# Patient Record
Sex: FEMALE | Race: WHITE | URBAN - METROPOLITAN AREA
[De-identification: names, ages, dates, MRNs, and addresses within clinical notes are randomized per-mention and may not be internally consistent; named-entity substitution may affect disease eponyms.]

---

## 2021-04-15 ENCOUNTER — APPOINTMENT (RX ONLY)
Dept: URBAN - METROPOLITAN AREA CLINIC 49 | Facility: CLINIC | Age: 71
Setting detail: DERMATOLOGY
End: 2021-04-15

## 2021-04-15 DIAGNOSIS — D22 MELANOCYTIC NEVI: ICD-10-CM

## 2021-04-15 DIAGNOSIS — L57.0 ACTINIC KERATOSIS: ICD-10-CM

## 2021-04-15 DIAGNOSIS — Z71.89 OTHER SPECIFIED COUNSELING: ICD-10-CM

## 2021-04-15 DIAGNOSIS — L82.1 OTHER SEBORRHEIC KERATOSIS: ICD-10-CM

## 2021-04-15 DIAGNOSIS — L81.4 OTHER MELANIN HYPERPIGMENTATION: ICD-10-CM

## 2021-04-15 DIAGNOSIS — D18.0 HEMANGIOMA: ICD-10-CM

## 2021-04-15 DIAGNOSIS — D485 NEOPLASM OF UNCERTAIN BEHAVIOR OF SKIN: ICD-10-CM | Status: WORSENING

## 2021-04-15 PROBLEM — D18.01 HEMANGIOMA OF SKIN AND SUBCUTANEOUS TISSUE: Status: ACTIVE | Noted: 2021-04-15

## 2021-04-15 PROBLEM — D22.5 MELANOCYTIC NEVI OF TRUNK: Status: ACTIVE | Noted: 2021-04-15

## 2021-04-15 PROBLEM — D48.5 NEOPLASM OF UNCERTAIN BEHAVIOR OF SKIN: Status: ACTIVE | Noted: 2021-04-15

## 2021-04-15 PROCEDURE — ? LIQUID NITROGEN

## 2021-04-15 PROCEDURE — ? COUNSELING

## 2021-04-15 PROCEDURE — 11102 TANGNTL BX SKIN SINGLE LES: CPT

## 2021-04-15 PROCEDURE — ? BIOPSY BY SHAVE METHOD

## 2021-04-15 PROCEDURE — 99203 OFFICE O/P NEW LOW 30 MIN: CPT | Mod: 25

## 2021-04-15 PROCEDURE — ? SUNSCREEN RECOMMENDATIONS

## 2021-04-15 PROCEDURE — 17003 DESTRUCT PREMALG LES 2-14: CPT

## 2021-04-15 PROCEDURE — 17000 DESTRUCT PREMALG LESION: CPT | Mod: 59

## 2021-04-15 ASSESSMENT — LOCATION DETAILED DESCRIPTION DERM
LOCATION DETAILED: RIGHT PROXIMAL POSTERIOR UPPER ARM
LOCATION DETAILED: LEFT SUPERIOR UPPER BACK
LOCATION DETAILED: NASAL TIP
LOCATION DETAILED: LEFT INFERIOR NASAL CHEEK
LOCATION DETAILED: RIGHT MEDIAL MALAR CHEEK
LOCATION DETAILED: EPIGASTRIC SKIN
LOCATION DETAILED: LEFT INFERIOR UPPER BACK

## 2021-04-15 ASSESSMENT — LOCATION SIMPLE DESCRIPTION DERM
LOCATION SIMPLE: NOSE
LOCATION SIMPLE: ABDOMEN
LOCATION SIMPLE: LEFT UPPER BACK
LOCATION SIMPLE: LEFT CHEEK
LOCATION SIMPLE: RIGHT POSTERIOR UPPER ARM
LOCATION SIMPLE: RIGHT CHEEK

## 2021-04-15 ASSESSMENT — LOCATION ZONE DERM
LOCATION ZONE: ARM
LOCATION ZONE: FACE
LOCATION ZONE: NOSE
LOCATION ZONE: FACE
LOCATION ZONE: TRUNK

## 2021-04-15 NOTE — PROCEDURE: BIOPSY BY SHAVE METHOD
Detail Level: Detailed
Depth Of Biopsy: dermis
Was A Bandage Applied: Yes
Size Of Lesion In Cm: 1
Biopsy Type: H and E
Biopsy Method: Dermablade
Anesthesia Type: 1% lidocaine with epinephrine
Anesthesia Volume In Cc (Will Not Render If 0): 0.5
Hemostasis: Aluminum Chloride
Wound Care: Petrolatum
Dressing: bandage
Destruction After The Procedure: No
Type Of Destruction Used: Curettage
Curettage Text: The wound bed was treated with curettage after the biopsy was performed.
Cryotherapy Text: The wound bed was treated with cryotherapy after the biopsy was performed.
Electrodesiccation Text: The wound bed was treated with electrodesiccation after the biopsy was performed.
Electrodesiccation And Curettage Text: The wound bed was treated with electrodesiccation and curettage after the biopsy was performed.
Silver Nitrate Text: The wound bed was treated with silver nitrate after the biopsy was performed.
Lab: 6
Lab Facility: 3
Consent: Written consent was obtained and risks were reviewed including but not limited to scarring, infection, bleeding, scabbing, incomplete removal, nerve damage and allergy to anesthesia.
Post-Care Instructions: I reviewed with the patient in detail post-care instructions. Patient is to keep the biopsy site dry overnight, and then apply Bacitracin, Vaseline or Polysporin once daily until healed.
Notification Instructions: Patient will be notified of biopsy results within 2-3 weeks.
Billing Type: Third-Party Bill
Information: Selecting Yes will display possible errors in your note based on the variables you have selected. This validation is only offered as a suggestion for you. PLEASE NOTE THAT THE VALIDATION TEXT WILL BE REMOVED WHEN YOU FINALIZE YOUR NOTE. IF YOU WANT TO FAX A PRELIMINARY NOTE YOU WILL NEED TO TOGGLE THIS TO 'NO' IF YOU DO NOT WANT IT IN YOUR FAXED NOTE.

## 2021-10-14 ENCOUNTER — APPOINTMENT (RX ONLY)
Dept: URBAN - METROPOLITAN AREA CLINIC 49 | Facility: CLINIC | Age: 71
Setting detail: DERMATOLOGY
End: 2021-10-14

## 2021-10-14 DIAGNOSIS — Z71.89 OTHER SPECIFIED COUNSELING: ICD-10-CM

## 2021-10-14 DIAGNOSIS — D18.0 HEMANGIOMA: ICD-10-CM

## 2021-10-14 DIAGNOSIS — D22 MELANOCYTIC NEVI: ICD-10-CM

## 2021-10-14 DIAGNOSIS — Z87.2 PERSONAL HISTORY OF DISEASES OF THE SKIN AND SUBCUTANEOUS TISSUE: ICD-10-CM

## 2021-10-14 DIAGNOSIS — L57.0 ACTINIC KERATOSIS: ICD-10-CM

## 2021-10-14 DIAGNOSIS — L82.1 OTHER SEBORRHEIC KERATOSIS: ICD-10-CM

## 2021-10-14 DIAGNOSIS — L81.4 OTHER MELANIN HYPERPIGMENTATION: ICD-10-CM

## 2021-10-14 PROBLEM — D22.5 MELANOCYTIC NEVI OF TRUNK: Status: ACTIVE | Noted: 2021-10-14

## 2021-10-14 PROBLEM — D18.01 HEMANGIOMA OF SKIN AND SUBCUTANEOUS TISSUE: Status: ACTIVE | Noted: 2021-10-14

## 2021-10-14 PROCEDURE — 99213 OFFICE O/P EST LOW 20 MIN: CPT | Mod: 25

## 2021-10-14 PROCEDURE — ? LIQUID NITROGEN

## 2021-10-14 PROCEDURE — ? SUNSCREEN RECOMMENDATIONS

## 2021-10-14 PROCEDURE — 17000 DESTRUCT PREMALG LESION: CPT

## 2021-10-14 PROCEDURE — 17003 DESTRUCT PREMALG LES 2-14: CPT

## 2021-10-14 PROCEDURE — ? COUNSELING

## 2021-10-14 ASSESSMENT — LOCATION SIMPLE DESCRIPTION DERM
LOCATION SIMPLE: LEFT UPPER BACK
LOCATION SIMPLE: LEFT CHEEK
LOCATION SIMPLE: RIGHT THIGH
LOCATION SIMPLE: NOSE
LOCATION SIMPLE: ABDOMEN

## 2021-10-14 ASSESSMENT — LOCATION ZONE DERM
LOCATION ZONE: TRUNK
LOCATION ZONE: LEG
LOCATION ZONE: NOSE
LOCATION ZONE: FACE

## 2021-10-14 ASSESSMENT — LOCATION DETAILED DESCRIPTION DERM
LOCATION DETAILED: EPIGASTRIC SKIN
LOCATION DETAILED: LEFT SUPERIOR UPPER BACK
LOCATION DETAILED: NASAL TIP
LOCATION DETAILED: LEFT INFERIOR UPPER BACK
LOCATION DETAILED: RIGHT ANTERIOR DISTAL THIGH
LOCATION DETAILED: LEFT INFERIOR NASAL CHEEK

## 2021-10-14 NOTE — PROCEDURE: LIQUID NITROGEN
Duration Of Freeze Thaw-Cycle (Seconds): 0
Number Of Freeze-Thaw Cycles: 1 freeze-thaw cycle
Consent: The patient's consent was obtained including but not limited to risks of crusting, scabbing, blistering, scarring, darker or lighter pigmentary change, recurrence, incomplete removal and infection.
Show Aperture Variable?: Yes
Application Tool (Optional): Liquid Nitrogen Sprayer
Render Note In Bullet Format When Appropriate: No
Post-Care Instructions: I reviewed with the patient in detail post-care instructions. Patient is to wear sunprotection, and avoid picking at any of the treated lesions. Pt may apply Vaseline to crusted or scabbing areas.
Detail Level: Detailed

## 2022-04-14 ENCOUNTER — APPOINTMENT (RX ONLY)
Dept: URBAN - METROPOLITAN AREA CLINIC 49 | Facility: CLINIC | Age: 72
Setting detail: DERMATOLOGY
End: 2022-04-14

## 2022-04-14 DIAGNOSIS — L81.4 OTHER MELANIN HYPERPIGMENTATION: ICD-10-CM

## 2022-04-14 DIAGNOSIS — L71.8 OTHER ROSACEA: ICD-10-CM

## 2022-04-14 DIAGNOSIS — D18.0 HEMANGIOMA: ICD-10-CM

## 2022-04-14 DIAGNOSIS — Z87.2 PERSONAL HISTORY OF DISEASES OF THE SKIN AND SUBCUTANEOUS TISSUE: ICD-10-CM

## 2022-04-14 DIAGNOSIS — D22 MELANOCYTIC NEVI: ICD-10-CM

## 2022-04-14 DIAGNOSIS — Z71.89 OTHER SPECIFIED COUNSELING: ICD-10-CM

## 2022-04-14 DIAGNOSIS — L82.1 OTHER SEBORRHEIC KERATOSIS: ICD-10-CM

## 2022-04-14 PROBLEM — D18.01 HEMANGIOMA OF SKIN AND SUBCUTANEOUS TISSUE: Status: ACTIVE | Noted: 2022-04-14

## 2022-04-14 PROBLEM — D22.5 MELANOCYTIC NEVI OF TRUNK: Status: ACTIVE | Noted: 2022-04-14

## 2022-04-14 PROCEDURE — ? SUNSCREEN RECOMMENDATIONS

## 2022-04-14 PROCEDURE — ? FULL BODY SKIN EXAM

## 2022-04-14 PROCEDURE — 99213 OFFICE O/P EST LOW 20 MIN: CPT

## 2022-04-14 PROCEDURE — ? PRESCRIPTION

## 2022-04-14 PROCEDURE — ? COUNSELING

## 2022-04-14 RX ORDER — METRONIDAZOLE 7.5 MG/G
CREAM TOPICAL BID
Qty: 45 | Refills: 5 | Status: ERX | COMMUNITY
Start: 2022-04-14

## 2022-04-14 RX ADMIN — METRONIDAZOLE: 7.5 CREAM TOPICAL at 00:00

## 2022-04-14 ASSESSMENT — LOCATION ZONE DERM: LOCATION ZONE: TRUNK

## 2022-04-14 ASSESSMENT — LOCATION DETAILED DESCRIPTION DERM
LOCATION DETAILED: LEFT INFERIOR UPPER BACK
LOCATION DETAILED: LEFT SUPERIOR UPPER BACK
LOCATION DETAILED: EPIGASTRIC SKIN
LOCATION DETAILED: LEFT RIB CAGE

## 2022-04-14 ASSESSMENT — LOCATION SIMPLE DESCRIPTION DERM
LOCATION SIMPLE: LEFT UPPER BACK
LOCATION SIMPLE: ABDOMEN

## 2022-04-14 NOTE — PROCEDURE: MIPS QUALITY
Quality 111:Pneumonia Vaccination Status For Older Adults: Pneumococcal Vaccination not Administered or Previously Received, Reason not Otherwise Specified
Quality 130: Documentation Of Current Medications In The Medical Record: Current Medications Documented
Quality 110: Preventive Care And Screening: Influenza Immunization: Influenza Immunization Administered during Influenza season
Quality 431: Preventive Care And Screening: Unhealthy Alcohol Use - Screening: Patient not identified as an unhealthy alcohol user when screened for unhealthy alcohol use using a systematic screening method
Quality 137: Melanoma: Continuity Of Care - Recall System: Recall system not utilized, reason not otherwise specified
Detail Level: Detailed
Quality 226: Preventive Care And Screening: Tobacco Use: Screening And Cessation Intervention: Patient screened for tobacco use and is an ex/non-smoker

## 2022-07-13 ENCOUNTER — APPOINTMENT (RX ONLY)
Dept: URBAN - METROPOLITAN AREA CLINIC 49 | Facility: CLINIC | Age: 72
Setting detail: DERMATOLOGY
End: 2022-07-13

## 2022-07-13 DIAGNOSIS — L82.0 INFLAMED SEBORRHEIC KERATOSIS: ICD-10-CM

## 2022-07-13 PROCEDURE — ? COUNSELING

## 2022-07-13 PROCEDURE — ? FULL BODY SKIN EXAM - DECLINED

## 2022-07-13 PROCEDURE — 17110 DESTRUCTION B9 LES UP TO 14: CPT

## 2022-07-13 PROCEDURE — ? LIQUID NITROGEN

## 2022-07-13 ASSESSMENT — LOCATION SIMPLE DESCRIPTION DERM: LOCATION SIMPLE: LEFT CHEEK

## 2022-07-13 ASSESSMENT — LOCATION DETAILED DESCRIPTION DERM: LOCATION DETAILED: LEFT MEDIAL MALAR CHEEK

## 2022-07-13 ASSESSMENT — LOCATION ZONE DERM: LOCATION ZONE: FACE

## 2022-07-13 NOTE — PROCEDURE: LIQUID NITROGEN
Show Topical Anesthesia Variable?: Yes
Detail Level: Detailed
Consent: The patient's consent was obtained including but not limited to risks of crusting, scabbing, blistering, scarring, darker or lighter pigmentary change, recurrence, incomplete removal and infection.
Include Z78.9 (Other Specified Conditions Influencing Health Status) As An Associated Diagnosis?: No
Medical Necessity Information: It is in your best interest to select a reason for this procedure from the list below. All of these items fulfill various CMS LCD requirements except the new and changing color options.
Post-Care Instructions: I reviewed with the patient in detail post-care instructions. Patient is to wear sunprotection, and avoid picking at any of the treated lesions. Pt may apply Vaseline to crusted or scabbing areas.
Medical Necessity Clause: This procedure was medically necessary because the lesions that were treated were:
Number Of Freeze-Thaw Cycles: 2 freeze-thaw cycles
Spray Paint Text: The liquid nitrogen was applied to the skin utilizing a spray paint frosting technique.

## 2022-07-13 NOTE — PROCEDURE: COUNSELING
Patient Specific Counseling (Will Not Stick From Patient To Patient): Pt will RTC in 3 weeks for re-evaluation. If lesion is still present, will likely biopsy.
Detail Level: Detailed

## 2022-08-03 ENCOUNTER — APPOINTMENT (RX ONLY)
Dept: URBAN - METROPOLITAN AREA CLINIC 49 | Facility: CLINIC | Age: 72
Setting detail: DERMATOLOGY
End: 2022-08-03

## 2022-08-03 VITALS — HEIGHT: 65 IN | WEIGHT: 155 LBS

## 2022-08-03 DIAGNOSIS — L82.0 INFLAMED SEBORRHEIC KERATOSIS: ICD-10-CM | Status: IMPROVED

## 2022-08-03 DIAGNOSIS — L85.3 XEROSIS CUTIS: ICD-10-CM

## 2022-08-03 PROCEDURE — ? COUNSELING

## 2022-08-03 PROCEDURE — 99212 OFFICE O/P EST SF 10 MIN: CPT | Mod: 25

## 2022-08-03 PROCEDURE — ? FULL BODY SKIN EXAM - DECLINED

## 2022-08-03 PROCEDURE — ? PRESCRIPTION

## 2022-08-03 PROCEDURE — 17110 DESTRUCTION B9 LES UP TO 14: CPT

## 2022-08-03 PROCEDURE — ? PRESCRIPTION MEDICATION MANAGEMENT

## 2022-08-03 PROCEDURE — ? LIQUID NITROGEN

## 2022-08-03 RX ORDER — AMMONIUM LACTATE 12 G/100G
LOTION TOPICAL
Qty: 225 | Refills: 0 | Status: ERX | COMMUNITY
Start: 2022-08-03

## 2022-08-03 RX ADMIN — AMMONIUM LACTATE: 12 LOTION TOPICAL at 00:00

## 2022-08-03 ASSESSMENT — LOCATION SIMPLE DESCRIPTION DERM
LOCATION SIMPLE: LEFT PRETIBIAL REGION
LOCATION SIMPLE: LEFT CHEEK
LOCATION SIMPLE: RIGHT PRETIBIAL REGION

## 2022-08-03 ASSESSMENT — LOCATION ZONE DERM
LOCATION ZONE: FACE
LOCATION ZONE: LEG

## 2022-08-03 ASSESSMENT — LOCATION DETAILED DESCRIPTION DERM
LOCATION DETAILED: LEFT PROXIMAL PRETIBIAL REGION
LOCATION DETAILED: RIGHT PROXIMAL PRETIBIAL REGION
LOCATION DETAILED: LEFT MEDIAL MALAR CHEEK

## 2022-08-03 NOTE — PROCEDURE: PRESCRIPTION MEDICATION MANAGEMENT
1. Holter 6 months   2. Labs 6 months   3. Decrease in Lisinopril 2.5mg daily       Patient Education     Tips for Quitting Smoking (Cardiovascular)  Quitting smoking is a gift to yourself, one of the best things you can do to keep your heart disease from getting worse. Smoking reduces oxygen flow to your heart by speeding the buildup of plaque and changing the health of your blood vessels. This increases your risk for heart attack, also known as acute myocardial infarction, or AMI. Quitting helps reduce smoking's harmful effects. You may have tried to quit before, but don’t give up. Try again. Many smokers try 4 or 5 times before they succeed. It is never too early to benefit from smoking cessation, especially if you already have chronic conditions such as high blood pressure and high cholesterol that put you at increased risk for cardiovascular disease.     You’ll have the best chance of success if you join a stop-smoking group and have the support of your doctor, family and friends.     Line up help  · Ask for the support of your family and friends.  · Join a smoking cessation class, or ask your healthcare provider for a referral to a psychologist who specializes in helping people quit smoking.   · Ask your healthcare provider about nicotine replacement products and prescription medicines that can help you quit.  Set a quit date  · Choose a date within the next 2 to 4 weeks.  · After picking a day, nolvia it in bold letters on a calendar.     Your quit list  Ideas to stop smoking include:  1. Start by giving up cigarettes at the times you least need them.  2. Keeping a piece of fruit close by at the times you are most vulnerable to reach for a cigarette.  3. Using a nicotine replacement product instead of a cigarette.  Write down a few more ideas.     Set limits  · Limit where you can smoke. Pick one room or a porch, and smoke only in that place.  · Make smoking outdoors a house rule. Other smokers won’t tempt you 
as much.  · Speak to smokers around you about your intent to stop smoking so they can show consideration for you and limit their smoking around you.  · Hang a list of  “quit benefits” in the spot where you smoke. Put one on the refrigerator and one on your car dashboard.     For more information  · smokefree.gov/nibn-fp-po-expert  · National Cancer [place] Smoking Quitline: 877-44U-QUIT (317-720-3521)      Date Last Reviewed: 3/26/2016  © 9116-1785 [employer]. [address] [place] [address]. All rights reserved. This information is not intended as a substitute for professional medical care. Always follow your healthcare professional's instructions.           Patient Education     Staying Smoke-Free     Deep breathing can help ease the urge to smoke.     Quitting smoking is a big change. People will congratulate you. You have the right to be proud. But later at times you may miss smoking. Plan ahead to resist temptation.  Prepare to be tempted  · If you feel the urge to smoke, distract yourself for about 5 minutes. Drink water. Call a friend, walk around the room, or try deep breathing. Usually, the urge to smoke will pass.  · Don’t trust yourself to have “just one cigarette.” Many ex-smokers get hooked again that way.  · Remind yourself why you quit. Tell yourself you can stay quit.  · Avoid people or places that can trigger you to smoke. Ask others not to smoke in your home or car.  · Spend time in places where you can’t smoke-- a museum, a library, a store, or a gym.  · Take your nonsmoking life one day at a time. Donis each day on your calendar.  · HALT your desire. Keep yourself from feeling too Hungry, Angry, Lonely, or Tired. Deal with your real needs. Eat, talk, or sleep.  · Put aside cigarette money and reward yourself.  If you slip  You may slip and smoke again. Many ex-smokers slip on the way to success. If you do, it’s not the end of your quit process. Think about what triggered 
you to smoke. Then think of ways to prevent future slips. Ask yourself what you can learn from the slip. Decide how you will handle this trigger better in the future. Then get back on track--right away!  Don’t give up  Keep telling yourself you’re no longer a smoker. Don’t lose hope. Most people have tried to quit several times before being successful. Try to stay focused on your plan to be smoke-free. Keep in mind all the benefits of staying quit. Millions of people have given up smoking. You can too.        For more information  · https://smokefree.gov/cdve-xj-fn-expert  · National Cancer [place] Smoking Quitline: 877-44U-QUIT (943-736-1506)      Date Last Reviewed: 2/1/2017 © 2000-2018 [employer]. [address] [place] [address]. All rights reserved. This information is not intended as a substitute for professional medical care. Always follow your healthcare professional's instructions.           Patient Education     The Benefits of Living Smoke Free  What do you want to gain from quitting? Check off some reasons to quit.  Health benefits  ___  Improve my ability to breathe without coughing or shortness of breath  ___  Reduce my risk of lung cancer, heart disease, chronic lung disease  ___  Have fewer wrinkles and softer skin  ___  Improve my sense of taste and smell  ___  For pregnant women--reduce the risk of having a miscarriage, stillbirth, premature birth, or low-birth-weight baby  Personal benefits  ___  Feel more in control of my life  ___  Have better-smelling hair, breath, clothes, home, and car  ___  Save time by not having to take smoke breaks, buy cigarettes, or hunt for a light  ___  Have whiter teeth  Family benefits  ___  Reduce my children’s respiratory tract infections  ___  Set a good example for my children  ___  Reduce my family’s cancer risk  Financial benefits  ___  Save hundreds of dollars each year that would be spent on cigarettes  ___  Save money on medical 
bills  ___  Save on life, health, and car insurance premiums     Those dollars add up!  Cigarettes are expensive, and getting more expensive all the time. Do you realize how much money you are spending on cigarettes per year? What is the average amount you spend on a pack of cigarettes? What is the average number of packs that you smoke per day? Using your answers to these questions, fill in this formula to help you find out:  ($ _____ per pack) ×  ( _____ number of packs per day) × (365 days) =  $ _____ yearly cost of smoking  Besides tobacco, there are other costs, including extra cleaning bills and replacement costs for clothing and furniture; medical expenses for smoking-related illnesses; and higher health, life, and car insurance premiums.  Cigars and pipes count too!  Cigars and pipes are also dangerous. So are smokeless (chewing) tobacco and snuff. All of these products contain nicotine, a highly addictive substance that has harmful effects on your body. Quitting smoking means giving up all tobacco products.      For more information  · https://smokefree.gov/jbre-ky-ux-expert  · National Cancer [place] Smoking Quitline: 877-44U-QUIT (336-249-4754)   Date Last Reviewed: 2/1/2017  © 9345-1319 [employer]. [address] [place] [address], [place] [address]. All rights reserved. This information is not intended as a substitute for professional medical care. Always follow your healthcare professional's instructions.             
Initiate Treatment: ammonium lactate 12 % lotion: Apply BID to AA on body PRN for dry skin
Plan: She is having labs done, including  thyroid function in the next 1-2 weeks, she does not have a thyroid so they are monitoring hormone levels.
Render In Strict Bullet Format?: No
Detail Level: Zone

## 2022-10-21 ENCOUNTER — APPOINTMENT (RX ONLY)
Dept: URBAN - METROPOLITAN AREA CLINIC 49 | Facility: CLINIC | Age: 72
Setting detail: DERMATOLOGY
End: 2022-10-21

## 2022-10-21 DIAGNOSIS — D22 MELANOCYTIC NEVI: ICD-10-CM

## 2022-10-21 DIAGNOSIS — D18.0 HEMANGIOMA: ICD-10-CM

## 2022-10-21 DIAGNOSIS — L57.0 ACTINIC KERATOSIS: ICD-10-CM

## 2022-10-21 DIAGNOSIS — Z87.2 PERSONAL HISTORY OF DISEASES OF THE SKIN AND SUBCUTANEOUS TISSUE: ICD-10-CM

## 2022-10-21 DIAGNOSIS — Z71.89 OTHER SPECIFIED COUNSELING: ICD-10-CM

## 2022-10-21 DIAGNOSIS — L82.1 OTHER SEBORRHEIC KERATOSIS: ICD-10-CM

## 2022-10-21 DIAGNOSIS — L81.4 OTHER MELANIN HYPERPIGMENTATION: ICD-10-CM

## 2022-10-21 PROBLEM — D18.01 HEMANGIOMA OF SKIN AND SUBCUTANEOUS TISSUE: Status: ACTIVE | Noted: 2022-10-21

## 2022-10-21 PROBLEM — D22.5 MELANOCYTIC NEVI OF TRUNK: Status: ACTIVE | Noted: 2022-10-21

## 2022-10-21 PROCEDURE — ? COUNSELING

## 2022-10-21 PROCEDURE — ? LIQUID NITROGEN

## 2022-10-21 PROCEDURE — ? SUNSCREEN RECOMMENDATIONS

## 2022-10-21 PROCEDURE — 17000 DESTRUCT PREMALG LESION: CPT

## 2022-10-21 PROCEDURE — ? FULL BODY SKIN EXAM

## 2022-10-21 PROCEDURE — 99213 OFFICE O/P EST LOW 20 MIN: CPT | Mod: 25

## 2022-10-21 ASSESSMENT — LOCATION DETAILED DESCRIPTION DERM
LOCATION DETAILED: LEFT INFERIOR UPPER BACK
LOCATION DETAILED: NASAL DORSUM
LOCATION DETAILED: LEFT SUPERIOR UPPER BACK
LOCATION DETAILED: EPIGASTRIC SKIN
LOCATION DETAILED: LEFT RIB CAGE

## 2022-10-21 ASSESSMENT — LOCATION SIMPLE DESCRIPTION DERM
LOCATION SIMPLE: ABDOMEN
LOCATION SIMPLE: LEFT UPPER BACK
LOCATION SIMPLE: NOSE

## 2022-10-21 ASSESSMENT — LOCATION ZONE DERM
LOCATION ZONE: TRUNK
LOCATION ZONE: NOSE

## 2022-10-21 NOTE — PROCEDURE: LIQUID NITROGEN
Consent: The patient's consent was obtained including but not limited to risks of crusting, scabbing, blistering, scarring, darker or lighter pigmentary change, recurrence, incomplete removal and infection.
Application Tool (Optional): Liquid Nitrogen Sprayer
Show Aperture Variable?: Yes
Number Of Freeze-Thaw Cycles: 1 freeze-thaw cycle
Duration Of Freeze Thaw-Cycle (Seconds): 0
Render Note In Bullet Format When Appropriate: No
Detail Level: Detailed
Post-Care Instructions: I reviewed with the patient in detail post-care instructions. Patient is to wear sunprotection, and avoid picking at any of the treated lesions. Pt may apply Vaseline to crusted or scabbing areas.

## 2023-04-25 ENCOUNTER — APPOINTMENT (RX ONLY)
Dept: URBAN - METROPOLITAN AREA CLINIC 49 | Facility: CLINIC | Age: 73
Setting detail: DERMATOLOGY
End: 2023-04-25

## 2023-04-25 DIAGNOSIS — L82.1 OTHER SEBORRHEIC KERATOSIS: ICD-10-CM

## 2023-04-25 DIAGNOSIS — D18.0 HEMANGIOMA: ICD-10-CM

## 2023-04-25 DIAGNOSIS — D22 MELANOCYTIC NEVI: ICD-10-CM

## 2023-04-25 DIAGNOSIS — Z85.828 PERSONAL HISTORY OF OTHER MALIGNANT NEOPLASM OF SKIN: ICD-10-CM

## 2023-04-25 DIAGNOSIS — L81.4 OTHER MELANIN HYPERPIGMENTATION: ICD-10-CM

## 2023-04-25 DIAGNOSIS — L82.0 INFLAMED SEBORRHEIC KERATOSIS: ICD-10-CM

## 2023-04-25 PROBLEM — D22.61 MELANOCYTIC NEVI OF RIGHT UPPER LIMB, INCLUDING SHOULDER: Status: ACTIVE | Noted: 2023-04-25

## 2023-04-25 PROBLEM — D22.62 MELANOCYTIC NEVI OF LEFT UPPER LIMB, INCLUDING SHOULDER: Status: ACTIVE | Noted: 2023-04-25

## 2023-04-25 PROBLEM — D48.5 NEOPLASM OF UNCERTAIN BEHAVIOR OF SKIN: Status: ACTIVE | Noted: 2023-04-25

## 2023-04-25 PROBLEM — D18.01 HEMANGIOMA OF SKIN AND SUBCUTANEOUS TISSUE: Status: ACTIVE | Noted: 2023-04-25

## 2023-04-25 PROBLEM — D22.5 MELANOCYTIC NEVI OF TRUNK: Status: ACTIVE | Noted: 2023-04-25

## 2023-04-25 PROCEDURE — 99213 OFFICE O/P EST LOW 20 MIN: CPT | Mod: 25

## 2023-04-25 PROCEDURE — ? SUNSCREEN RECOMMENDATIONS

## 2023-04-25 PROCEDURE — ? LIQUID NITROGEN

## 2023-04-25 PROCEDURE — 11102 TANGNTL BX SKIN SINGLE LES: CPT | Mod: 59

## 2023-04-25 PROCEDURE — ? COUNSELING

## 2023-04-25 PROCEDURE — ? BIOPSY BY SHAVE METHOD

## 2023-04-25 PROCEDURE — 17110 DESTRUCTION B9 LES UP TO 14: CPT

## 2023-04-25 ASSESSMENT — LOCATION SIMPLE DESCRIPTION DERM
LOCATION SIMPLE: LEFT FOREARM
LOCATION SIMPLE: RIGHT PRETIBIAL REGION
LOCATION SIMPLE: ABDOMEN
LOCATION SIMPLE: LEFT PRETIBIAL REGION
LOCATION SIMPLE: RIGHT CHEEK
LOCATION SIMPLE: LEFT LOWER BACK
LOCATION SIMPLE: LEFT CHEEK
LOCATION SIMPLE: RIGHT FOREARM
LOCATION SIMPLE: RIGHT UPPER BACK
LOCATION SIMPLE: UPPER BACK
LOCATION SIMPLE: LEFT UPPER BACK
LOCATION SIMPLE: CHEST

## 2023-04-25 ASSESSMENT — LOCATION DETAILED DESCRIPTION DERM
LOCATION DETAILED: INFERIOR THORACIC SPINE
LOCATION DETAILED: LEFT PROXIMAL DORSAL FOREARM
LOCATION DETAILED: LEFT INFERIOR UPPER BACK
LOCATION DETAILED: RIGHT MEDIAL UPPER BACK
LOCATION DETAILED: RIGHT SUPERIOR MEDIAL UPPER BACK
LOCATION DETAILED: LEFT MEDIAL SUPERIOR CHEST
LOCATION DETAILED: RIGHT INFERIOR CENTRAL MALAR CHEEK
LOCATION DETAILED: LEFT INFERIOR LATERAL MIDBACK
LOCATION DETAILED: RIGHT DISTAL PRETIBIAL REGION
LOCATION DETAILED: RIGHT DISTAL DORSAL FOREARM
LOCATION DETAILED: RIGHT PROXIMAL DORSAL FOREARM
LOCATION DETAILED: LEFT INFERIOR CENTRAL MALAR CHEEK
LOCATION DETAILED: PERIUMBILICAL SKIN
LOCATION DETAILED: LEFT DISTAL DORSAL FOREARM
LOCATION DETAILED: MIDDLE STERNUM
LOCATION DETAILED: LEFT DISTAL PRETIBIAL REGION

## 2023-04-25 ASSESSMENT — LOCATION ZONE DERM
LOCATION ZONE: LEG
LOCATION ZONE: TRUNK
LOCATION ZONE: FACE
LOCATION ZONE: ARM

## 2023-04-25 NOTE — PROCEDURE: LIQUID NITROGEN
Consent: The patient's consent was obtained including but not limited to risks of crusting, scabbing, blistering, scarring, darker or lighter pigmentary change, recurrence, incomplete removal and infection.
Add 52 Modifier (Optional): no
Detail Level: Zone
Medical Necessity Information: It is in your best interest to select a reason for this procedure from the list below. All of these items fulfill various CMS LCD requirements except the new and changing color options.
Show Topical Anesthesia Variable?: Yes
Spray Paint Text: The liquid nitrogen was applied to the skin utilizing a spray paint frosting technique.
Medical Necessity Clause: This procedure was medically necessary because the lesions that were treated were:
Post-Care Instructions: I reviewed with the patient in detail post-care instructions. Patient is to wear sunprotection, and avoid picking at any of the treated lesions. Pt may apply Vaseline to crusted or scabbing areas.

## 2023-04-25 NOTE — PROCEDURE: COUNSELING
Detail Level: Zone
Sunscreen Recommendations: Recommended she see a plastic surgeon if she would like them removed.
Detail Level: Detailed

## 2023-04-25 NOTE — PROCEDURE: BIOPSY BY SHAVE METHOD

## 2023-10-24 ENCOUNTER — APPOINTMENT (RX ONLY)
Dept: URBAN - METROPOLITAN AREA CLINIC 49 | Facility: CLINIC | Age: 73
Setting detail: DERMATOLOGY
End: 2023-10-24

## 2023-10-24 VITALS — WEIGHT: 165 LBS

## 2023-10-24 DIAGNOSIS — L82.1 OTHER SEBORRHEIC KERATOSIS: ICD-10-CM

## 2023-10-24 DIAGNOSIS — D22 MELANOCYTIC NEVI: ICD-10-CM

## 2023-10-24 DIAGNOSIS — Z85.828 PERSONAL HISTORY OF OTHER MALIGNANT NEOPLASM OF SKIN: ICD-10-CM

## 2023-10-24 DIAGNOSIS — D18.0 HEMANGIOMA: ICD-10-CM

## 2023-10-24 DIAGNOSIS — L81.4 OTHER MELANIN HYPERPIGMENTATION: ICD-10-CM

## 2023-10-24 PROBLEM — D22.61 MELANOCYTIC NEVI OF RIGHT UPPER LIMB, INCLUDING SHOULDER: Status: ACTIVE | Noted: 2023-10-24

## 2023-10-24 PROBLEM — D22.5 MELANOCYTIC NEVI OF TRUNK: Status: ACTIVE | Noted: 2023-10-24

## 2023-10-24 PROBLEM — D18.01 HEMANGIOMA OF SKIN AND SUBCUTANEOUS TISSUE: Status: ACTIVE | Noted: 2023-10-24

## 2023-10-24 PROBLEM — D22.62 MELANOCYTIC NEVI OF LEFT UPPER LIMB, INCLUDING SHOULDER: Status: ACTIVE | Noted: 2023-10-24

## 2023-10-24 PROBLEM — D48.5 NEOPLASM OF UNCERTAIN BEHAVIOR OF SKIN: Status: ACTIVE | Noted: 2023-10-24

## 2023-10-24 PROCEDURE — 99213 OFFICE O/P EST LOW 20 MIN: CPT | Mod: 25

## 2023-10-24 PROCEDURE — ? FULL BODY SKIN EXAM

## 2023-10-24 PROCEDURE — ? COUNSELING

## 2023-10-24 PROCEDURE — ? BIOPSY BY SHAVE METHOD

## 2023-10-24 PROCEDURE — ? SUNSCREEN RECOMMENDATIONS

## 2023-10-24 PROCEDURE — 11102 TANGNTL BX SKIN SINGLE LES: CPT

## 2023-10-24 ASSESSMENT — LOCATION DETAILED DESCRIPTION DERM
LOCATION DETAILED: MIDDLE STERNUM
LOCATION DETAILED: RIGHT DISTAL DORSAL FOREARM
LOCATION DETAILED: RIGHT SUPERIOR MEDIAL UPPER BACK
LOCATION DETAILED: LEFT INFERIOR CENTRAL MALAR CHEEK
LOCATION DETAILED: LEFT MEDIAL SUPERIOR CHEST
LOCATION DETAILED: RIGHT MEDIAL UPPER BACK
LOCATION DETAILED: INFERIOR THORACIC SPINE
LOCATION DETAILED: LEFT PROXIMAL DORSAL FOREARM
LOCATION DETAILED: PERIUMBILICAL SKIN
LOCATION DETAILED: LEFT DISTAL DORSAL FOREARM
LOCATION DETAILED: LEFT DISTAL PRETIBIAL REGION
LOCATION DETAILED: RIGHT PROXIMAL DORSAL FOREARM
LOCATION DETAILED: RIGHT INFERIOR CENTRAL MALAR CHEEK
LOCATION DETAILED: RIGHT DISTAL PRETIBIAL REGION

## 2023-10-24 ASSESSMENT — LOCATION SIMPLE DESCRIPTION DERM
LOCATION SIMPLE: CHEST
LOCATION SIMPLE: LEFT FOREARM
LOCATION SIMPLE: LEFT PRETIBIAL REGION
LOCATION SIMPLE: RIGHT UPPER BACK
LOCATION SIMPLE: RIGHT FOREARM
LOCATION SIMPLE: LEFT CHEEK
LOCATION SIMPLE: RIGHT CHEEK
LOCATION SIMPLE: ABDOMEN
LOCATION SIMPLE: RIGHT PRETIBIAL REGION
LOCATION SIMPLE: UPPER BACK

## 2023-10-24 ASSESSMENT — LOCATION ZONE DERM
LOCATION ZONE: TRUNK
LOCATION ZONE: ARM
LOCATION ZONE: LEG
LOCATION ZONE: FACE

## 2023-10-24 NOTE — PROCEDURE: BIOPSY BY SHAVE METHOD
Detail Level: Detailed
Depth Of Biopsy: dermis
Was A Bandage Applied: Yes
Size Of Lesion In Cm: 0
Biopsy Type: H and E
Biopsy Method: double edge Personna blade
Anesthesia Type: 1% lidocaine without epinephrine
Anesthesia Volume In Cc (Will Not Render If 0): 0.5
Hemostasis: Aluminum Chloride
Wound Care: Petrolatum
Dressing: bandage
Destruction After The Procedure: No
Type Of Destruction Used: Curettage
Curettage Text: The wound bed was treated with curettage after the biopsy was performed.
Cryotherapy Text: The wound bed was treated with cryotherapy after the biopsy was performed.
Electrodesiccation Text: The wound bed was treated with electrodesiccation after the biopsy was performed.
Electrodesiccation And Curettage Text: The wound bed was treated with electrodesiccation and curettage after the biopsy was performed.
Silver Nitrate Text: The wound bed was treated with silver nitrate after the biopsy was performed.
Lab: 6
Lab Facility: 3
Consent: Written consent was obtained and risks were reviewed including but not limited to scarring, infection, bleeding, scabbing, incomplete removal, nerve damage and allergy to anesthesia.
Post-Care Instructions: I reviewed with the patient in detail post-care instructions. Patient is to keep the biopsy site dry overnight, and then apply bacitracin twice daily until healed. Patient may apply hydrogen peroxide soaks to remove any crusting.
Notification Instructions: Patient will be notified of biopsy results. However, patient instructed to call the office if not contacted within 2 weeks.
Billing Type: Third-Party Bill
Information: Selecting Yes will display possible errors in your note based on the variables you have selected. This validation is only offered as a suggestion for you. PLEASE NOTE THAT THE VALIDATION TEXT WILL BE REMOVED WHEN YOU FINALIZE YOUR NOTE. IF YOU WANT TO FAX A PRELIMINARY NOTE YOU WILL NEED TO TOGGLE THIS TO 'NO' IF YOU DO NOT WANT IT IN YOUR FAXED NOTE.

## 2023-10-31 ENCOUNTER — RX ONLY (OUTPATIENT)
Age: 73
Setting detail: RX ONLY
End: 2023-10-31

## 2023-10-31 RX ORDER — FLUOROURACIL 5 MG/G
CREAM TOPICAL
Qty: 40 | Refills: 0 | Status: ERX | COMMUNITY
Start: 2023-10-31

## 2023-11-30 ENCOUNTER — APPOINTMENT (RX ONLY)
Dept: URBAN - METROPOLITAN AREA CLINIC 49 | Facility: CLINIC | Age: 73
Setting detail: DERMATOLOGY
End: 2023-11-30

## 2023-11-30 DIAGNOSIS — L24.4 IRRITANT CONTACT DERMATITIS DUE TO DRUGS IN CONTACT WITH SKIN: ICD-10-CM

## 2023-11-30 PROCEDURE — 99213 OFFICE O/P EST LOW 20 MIN: CPT

## 2023-11-30 PROCEDURE — ? PRESCRIPTION MEDICATION MANAGEMENT

## 2023-11-30 PROCEDURE — ? COUNSELING

## 2023-11-30 ASSESSMENT — LOCATION DETAILED DESCRIPTION DERM: LOCATION DETAILED: NASAL SUPRATIP

## 2023-11-30 ASSESSMENT — LOCATION SIMPLE DESCRIPTION DERM: LOCATION SIMPLE: NOSE

## 2023-11-30 ASSESSMENT — LOCATION ZONE DERM: LOCATION ZONE: NOSE

## 2023-11-30 NOTE — PROCEDURE: PRESCRIPTION MEDICATION MANAGEMENT
Detail Level: Zone
Continue Regimen: Efudex cream BID x 3 more weeks to aa where biopsy was taken, and 2 weeks to entire nose
Render In Strict Bullet Format?: No
Plan: Apply Aquaphor to aa after treatment \\nF/u 6 weeks

## 2024-01-17 ENCOUNTER — APPOINTMENT (RX ONLY)
Dept: URBAN - METROPOLITAN AREA CLINIC 49 | Facility: CLINIC | Age: 74
Setting detail: DERMATOLOGY
End: 2024-01-17

## 2024-01-17 DIAGNOSIS — L82.1 OTHER SEBORRHEIC KERATOSIS: ICD-10-CM

## 2024-01-17 DIAGNOSIS — L57.0 ACTINIC KERATOSIS: ICD-10-CM | Status: IMPROVED

## 2024-01-17 PROBLEM — D04.39 CARCINOMA IN SITU OF SKIN OF OTHER PARTS OF FACE: Status: ACTIVE | Noted: 2024-01-17

## 2024-01-17 PROCEDURE — ? DEFER

## 2024-01-17 PROCEDURE — ? PHOTO-DOCUMENTATION

## 2024-01-17 PROCEDURE — 99213 OFFICE O/P EST LOW 20 MIN: CPT

## 2024-01-17 PROCEDURE — ? COUNSELING

## 2024-01-17 ASSESSMENT — LOCATION SIMPLE DESCRIPTION DERM
LOCATION SIMPLE: LEFT CHEEK
LOCATION SIMPLE: RIGHT CHEEK
LOCATION SIMPLE: NOSE

## 2024-01-17 ASSESSMENT — LOCATION ZONE DERM
LOCATION ZONE: NOSE
LOCATION ZONE: FACE

## 2024-01-17 ASSESSMENT — LOCATION DETAILED DESCRIPTION DERM
LOCATION DETAILED: NASAL SUPRATIP
LOCATION DETAILED: RIGHT LATERAL MANDIBULAR CHEEK
LOCATION DETAILED: LEFT LATERAL MALAR CHEEK

## 2024-04-24 ENCOUNTER — APPOINTMENT (RX ONLY)
Dept: URBAN - METROPOLITAN AREA CLINIC 49 | Facility: CLINIC | Age: 74
Setting detail: DERMATOLOGY
End: 2024-04-24

## 2024-04-24 DIAGNOSIS — D18.0 HEMANGIOMA: ICD-10-CM

## 2024-04-24 DIAGNOSIS — L81.4 OTHER MELANIN HYPERPIGMENTATION: ICD-10-CM

## 2024-04-24 DIAGNOSIS — D22 MELANOCYTIC NEVI: ICD-10-CM

## 2024-04-24 DIAGNOSIS — L82.1 OTHER SEBORRHEIC KERATOSIS: ICD-10-CM

## 2024-04-24 DIAGNOSIS — Z85.828 PERSONAL HISTORY OF OTHER MALIGNANT NEOPLASM OF SKIN: ICD-10-CM

## 2024-04-24 PROBLEM — D22.61 MELANOCYTIC NEVI OF RIGHT UPPER LIMB, INCLUDING SHOULDER: Status: ACTIVE | Noted: 2024-04-24

## 2024-04-24 PROBLEM — D22.62 MELANOCYTIC NEVI OF LEFT UPPER LIMB, INCLUDING SHOULDER: Status: ACTIVE | Noted: 2024-04-24

## 2024-04-24 PROBLEM — D18.01 HEMANGIOMA OF SKIN AND SUBCUTANEOUS TISSUE: Status: ACTIVE | Noted: 2024-04-24

## 2024-04-24 PROBLEM — D22.5 MELANOCYTIC NEVI OF TRUNK: Status: ACTIVE | Noted: 2024-04-24

## 2024-04-24 PROBLEM — D48.5 NEOPLASM OF UNCERTAIN BEHAVIOR OF SKIN: Status: ACTIVE | Noted: 2024-04-24

## 2024-04-24 PROCEDURE — ? FULL BODY SKIN EXAM

## 2024-04-24 PROCEDURE — 99213 OFFICE O/P EST LOW 20 MIN: CPT | Mod: 25

## 2024-04-24 PROCEDURE — ? COUNSELING

## 2024-04-24 PROCEDURE — 11103 TANGNTL BX SKIN EA SEP/ADDL: CPT

## 2024-04-24 PROCEDURE — ? SUNSCREEN RECOMMENDATIONS

## 2024-04-24 PROCEDURE — ? BIOPSY BY SHAVE METHOD

## 2024-04-24 PROCEDURE — 11102 TANGNTL BX SKIN SINGLE LES: CPT

## 2024-04-24 ASSESSMENT — LOCATION DETAILED DESCRIPTION DERM
LOCATION DETAILED: EPIGASTRIC SKIN
LOCATION DETAILED: PERIUMBILICAL SKIN
LOCATION DETAILED: RIGHT DISTAL PRETIBIAL REGION
LOCATION DETAILED: RIGHT MEDIAL UPPER BACK
LOCATION DETAILED: RIGHT INFERIOR UPPER BACK
LOCATION DETAILED: INFERIOR THORACIC SPINE
LOCATION DETAILED: RIGHT PROXIMAL DORSAL FOREARM
LOCATION DETAILED: MIDDLE STERNUM
LOCATION DETAILED: RIGHT INFERIOR CENTRAL MALAR CHEEK
LOCATION DETAILED: LEFT MEDIAL SUPERIOR CHEST
LOCATION DETAILED: LEFT DISTAL PRETIBIAL REGION
LOCATION DETAILED: RIGHT SUPERIOR MEDIAL UPPER BACK
LOCATION DETAILED: LEFT DISTAL DORSAL FOREARM
LOCATION DETAILED: RIGHT DISTAL DORSAL FOREARM
LOCATION DETAILED: LEFT PROXIMAL DORSAL FOREARM
LOCATION DETAILED: LEFT INFERIOR CENTRAL MALAR CHEEK

## 2024-04-24 ASSESSMENT — LOCATION SIMPLE DESCRIPTION DERM
LOCATION SIMPLE: CHEST
LOCATION SIMPLE: RIGHT PRETIBIAL REGION
LOCATION SIMPLE: LEFT PRETIBIAL REGION
LOCATION SIMPLE: UPPER BACK
LOCATION SIMPLE: RIGHT UPPER BACK
LOCATION SIMPLE: LEFT CHEEK
LOCATION SIMPLE: RIGHT FOREARM
LOCATION SIMPLE: LEFT FOREARM
LOCATION SIMPLE: ABDOMEN
LOCATION SIMPLE: RIGHT CHEEK

## 2024-04-24 ASSESSMENT — LOCATION ZONE DERM
LOCATION ZONE: ARM
LOCATION ZONE: FACE
LOCATION ZONE: TRUNK
LOCATION ZONE: LEG

## 2024-04-24 NOTE — PROCEDURE: FULL BODY SKIN EXAM
Instructions: This plan will send the code FBSE to the PM system.  DO NOT or CHANGE the price.
Detail Level: Zone
Price (Do Not Change): 0.00
Continue Regimen: Triamcinolone cream continue twice daily. \\nHydroxyzine (as prescribed by rheumatologist).
Detail Level: Simple

## 2024-06-13 ENCOUNTER — APPOINTMENT (RX ONLY)
Dept: URBAN - METROPOLITAN AREA CLINIC 49 | Facility: CLINIC | Age: 74
Setting detail: DERMATOLOGY
End: 2024-06-13

## 2024-06-13 DIAGNOSIS — D22 MELANOCYTIC NEVI: ICD-10-CM

## 2024-06-13 PROBLEM — D23.39 OTHER BENIGN NEOPLASM OF SKIN OF OTHER PARTS OF FACE: Status: ACTIVE | Noted: 2024-06-13

## 2024-06-13 PROBLEM — D22.5 MELANOCYTIC NEVI OF TRUNK: Status: ACTIVE | Noted: 2024-06-13

## 2024-06-13 PROCEDURE — ? COUNSELING

## 2024-06-13 PROCEDURE — 99213 OFFICE O/P EST LOW 20 MIN: CPT

## 2024-06-13 ASSESSMENT — LOCATION DETAILED DESCRIPTION DERM: LOCATION DETAILED: RIGHT INFERIOR UPPER BACK

## 2024-06-13 ASSESSMENT — LOCATION ZONE DERM: LOCATION ZONE: TRUNK

## 2024-06-13 ASSESSMENT — LOCATION SIMPLE DESCRIPTION DERM: LOCATION SIMPLE: RIGHT UPPER BACK

## 2024-08-20 ENCOUNTER — APPOINTMENT (RX ONLY)
Dept: URBAN - METROPOLITAN AREA CLINIC 49 | Facility: CLINIC | Age: 74
Setting detail: DERMATOLOGY
End: 2024-08-20

## 2024-08-20 DIAGNOSIS — L82.0 INFLAMED SEBORRHEIC KERATOSIS: ICD-10-CM

## 2024-08-20 PROCEDURE — 17110 DESTRUCTION B9 LES UP TO 14: CPT

## 2024-08-20 PROCEDURE — ? LIQUID NITROGEN

## 2024-08-20 PROCEDURE — ? COUNSELING

## 2024-08-20 PROCEDURE — ? FULL BODY SKIN EXAM - DECLINED

## 2024-08-20 ASSESSMENT — LOCATION SIMPLE DESCRIPTION DERM: LOCATION SIMPLE: LEFT CHEEK

## 2024-08-20 ASSESSMENT — LOCATION DETAILED DESCRIPTION DERM: LOCATION DETAILED: LEFT INFERIOR CENTRAL MALAR CHEEK

## 2024-08-20 ASSESSMENT — LOCATION ZONE DERM: LOCATION ZONE: FACE

## 2024-08-20 NOTE — PROCEDURE: LIQUID NITROGEN
Post-Care Instructions: I reviewed with the patient in detail post-care instructions. Patient is to wear sunprotection, and avoid picking at any of the treated lesions. Pt may apply Vaseline to crusted or scabbing areas.
Render Post-Care Instructions In Note?: no
Detail Level: Detailed
Spray Paint Text: The liquid nitrogen was applied to the skin utilizing a spray paint frosting technique.
Medical Necessity Clause: This procedure was medically necessary because the lesions that were treated were:
Show Aperture Variable?: Yes
Medical Necessity Information: It is in your best interest to select a reason for this procedure from the list below. All of these items fulfill various CMS LCD requirements except the new and changing color options.
Consent: The patient's consent was obtained including but not limited to risks of crusting, scabbing, blistering, scarring, darker or lighter pigmentary change, recurrence, incomplete removal and infection.

## 2024-12-12 ENCOUNTER — APPOINTMENT (OUTPATIENT)
Dept: URBAN - METROPOLITAN AREA CLINIC 49 | Facility: CLINIC | Age: 74
Setting detail: DERMATOLOGY
End: 2024-12-12

## 2024-12-12 DIAGNOSIS — L81.4 OTHER MELANIN HYPERPIGMENTATION: ICD-10-CM

## 2024-12-12 DIAGNOSIS — L82.1 OTHER SEBORRHEIC KERATOSIS: ICD-10-CM

## 2024-12-12 DIAGNOSIS — Z85.828 PERSONAL HISTORY OF OTHER MALIGNANT NEOPLASM OF SKIN: ICD-10-CM

## 2024-12-12 DIAGNOSIS — Z87.2 PERSONAL HISTORY OF DISEASES OF THE SKIN AND SUBCUTANEOUS TISSUE: ICD-10-CM

## 2024-12-12 DIAGNOSIS — D22 MELANOCYTIC NEVI: ICD-10-CM

## 2024-12-12 DIAGNOSIS — D18.0 HEMANGIOMA: ICD-10-CM

## 2024-12-12 DIAGNOSIS — L82.0 INFLAMED SEBORRHEIC KERATOSIS: ICD-10-CM

## 2024-12-12 PROBLEM — D18.01 HEMANGIOMA OF SKIN AND SUBCUTANEOUS TISSUE: Status: ACTIVE | Noted: 2024-12-12

## 2024-12-12 PROBLEM — D22.62 MELANOCYTIC NEVI OF LEFT UPPER LIMB, INCLUDING SHOULDER: Status: ACTIVE | Noted: 2024-12-12

## 2024-12-12 PROBLEM — D48.5 NEOPLASM OF UNCERTAIN BEHAVIOR OF SKIN: Status: ACTIVE | Noted: 2024-12-12

## 2024-12-12 PROBLEM — D22.61 MELANOCYTIC NEVI OF RIGHT UPPER LIMB, INCLUDING SHOULDER: Status: ACTIVE | Noted: 2024-12-12

## 2024-12-12 PROBLEM — D22.5 MELANOCYTIC NEVI OF TRUNK: Status: ACTIVE | Noted: 2024-12-12

## 2024-12-12 PROCEDURE — 11102 TANGNTL BX SKIN SINGLE LES: CPT | Mod: 59

## 2024-12-12 PROCEDURE — ? FULL BODY SKIN EXAM

## 2024-12-12 PROCEDURE — ? LIQUID NITROGEN

## 2024-12-12 PROCEDURE — ? BIOPSY BY SHAVE METHOD

## 2024-12-12 PROCEDURE — 17110 DESTRUCTION B9 LES UP TO 14: CPT

## 2024-12-12 PROCEDURE — ? SUNSCREEN RECOMMENDATIONS

## 2024-12-12 PROCEDURE — ? COUNSELING

## 2024-12-12 PROCEDURE — 99213 OFFICE O/P EST LOW 20 MIN: CPT | Mod: 25

## 2024-12-12 ASSESSMENT — LOCATION SIMPLE DESCRIPTION DERM
LOCATION SIMPLE: RIGHT CHEEK
LOCATION SIMPLE: LEFT PRETIBIAL REGION
LOCATION SIMPLE: ABDOMEN
LOCATION SIMPLE: CHEST
LOCATION SIMPLE: RIGHT UPPER BACK
LOCATION SIMPLE: RIGHT FOREARM
LOCATION SIMPLE: LEFT CHEEK
LOCATION SIMPLE: UPPER BACK
LOCATION SIMPLE: RIGHT PRETIBIAL REGION
LOCATION SIMPLE: LEFT UPPER BACK
LOCATION SIMPLE: LEFT FOREARM

## 2024-12-12 ASSESSMENT — LOCATION DETAILED DESCRIPTION DERM
LOCATION DETAILED: EPIGASTRIC SKIN
LOCATION DETAILED: LEFT LATERAL UPPER BACK
LOCATION DETAILED: PERIUMBILICAL SKIN
LOCATION DETAILED: LEFT DISTAL DORSAL FOREARM
LOCATION DETAILED: LEFT MEDIAL SUPERIOR CHEST
LOCATION DETAILED: RIGHT DISTAL PRETIBIAL REGION
LOCATION DETAILED: RIGHT DISTAL DORSAL FOREARM
LOCATION DETAILED: MIDDLE STERNUM
LOCATION DETAILED: RIGHT SUPERIOR MEDIAL UPPER BACK
LOCATION DETAILED: INFERIOR THORACIC SPINE
LOCATION DETAILED: LEFT PROXIMAL DORSAL FOREARM
LOCATION DETAILED: RIGHT PROXIMAL DORSAL FOREARM
LOCATION DETAILED: LEFT MID-UPPER BACK
LOCATION DETAILED: LEFT INFERIOR CENTRAL MALAR CHEEK
LOCATION DETAILED: RIGHT INFERIOR CENTRAL MALAR CHEEK
LOCATION DETAILED: LEFT DISTAL PRETIBIAL REGION
LOCATION DETAILED: RIGHT MEDIAL UPPER BACK

## 2024-12-12 ASSESSMENT — LOCATION ZONE DERM
LOCATION ZONE: ARM
LOCATION ZONE: FACE
LOCATION ZONE: TRUNK
LOCATION ZONE: LEG

## 2024-12-12 NOTE — HPI: EVALUATION OF SKIN LESION(S)
What Type Of Note Output Would You Prefer (Optional)?: Bullet Format
Hpi Title: Evaluation of Skin Lesions
Year Removed: 1900
Additional History: Areas of concern on face and back.

## 2025-04-01 ENCOUNTER — APPOINTMENT (OUTPATIENT)
Dept: URBAN - METROPOLITAN AREA CLINIC 49 | Facility: CLINIC | Age: 75
Setting detail: DERMATOLOGY
End: 2025-04-01

## 2025-04-01 VITALS — HEIGHT: 63 IN | WEIGHT: 180 LBS

## 2025-04-01 DIAGNOSIS — L82.0 INFLAMED SEBORRHEIC KERATOSIS: ICD-10-CM | Status: RESOLVED

## 2025-04-01 PROBLEM — D48.5 NEOPLASM OF UNCERTAIN BEHAVIOR OF SKIN: Status: ACTIVE | Noted: 2025-04-01

## 2025-04-01 PROCEDURE — ? FULL BODY SKIN EXAM - DECLINED

## 2025-04-01 PROCEDURE — 11102 TANGNTL BX SKIN SINGLE LES: CPT

## 2025-04-01 PROCEDURE — 99212 OFFICE O/P EST SF 10 MIN: CPT | Mod: 25

## 2025-04-01 PROCEDURE — ? BIOPSY BY SHAVE METHOD

## 2025-04-01 PROCEDURE — ? COUNSELING

## 2025-04-01 ASSESSMENT — LOCATION SIMPLE DESCRIPTION DERM: LOCATION SIMPLE: LEFT CHEEK

## 2025-04-01 ASSESSMENT — LOCATION ZONE DERM: LOCATION ZONE: FACE

## 2025-04-01 ASSESSMENT — LOCATION DETAILED DESCRIPTION DERM: LOCATION DETAILED: LEFT MEDIAL MALAR CHEEK

## 2025-05-01 ENCOUNTER — APPOINTMENT (OUTPATIENT)
Dept: URBAN - METROPOLITAN AREA CLINIC 49 | Facility: CLINIC | Age: 75
Setting detail: DERMATOLOGY
End: 2025-05-01

## 2025-05-01 VITALS — HEIGHT: 63 IN | WEIGHT: 180 LBS

## 2025-05-01 DIAGNOSIS — L57.0 ACTINIC KERATOSIS: ICD-10-CM

## 2025-05-01 PROCEDURE — ? COUNSELING

## 2025-05-01 PROCEDURE — ? FULL BODY SKIN EXAM - DECLINED

## 2025-05-01 PROCEDURE — ? LIQUID NITROGEN

## 2025-05-01 PROCEDURE — 17000 DESTRUCT PREMALG LESION: CPT

## 2025-05-01 ASSESSMENT — LOCATION DETAILED DESCRIPTION DERM: LOCATION DETAILED: NASAL TIP

## 2025-05-01 ASSESSMENT — LOCATION SIMPLE DESCRIPTION DERM: LOCATION SIMPLE: NOSE

## 2025-05-01 ASSESSMENT — LOCATION ZONE DERM: LOCATION ZONE: NOSE

## 2025-05-01 NOTE — PROCEDURE: LIQUID NITROGEN
Consent: The patient's consent was obtained including but not limited to risks of crusting, scabbing, blistering, scarring, darker or lighter pigmentary change, recurrence, incomplete removal and infection.
Duration Of Freeze Thaw-Cycle (Seconds): 0
Number Of Freeze-Thaw Cycles: 1 freeze-thaw cycle
Render Post-Care Instructions In Note?: no
Detail Level: Simple
Show Aperture Variable?: Yes
Post-Care Instructions: I reviewed with the patient in detail post-care instructions. Patient is to wear sunprotection, and avoid picking at any of the treated lesions. Pt may apply Vaseline to crusted or scabbing areas.

## 2025-07-23 ENCOUNTER — APPOINTMENT (OUTPATIENT)
Dept: URBAN - METROPOLITAN AREA CLINIC 49 | Facility: CLINIC | Age: 75
Setting detail: DERMATOLOGY
End: 2025-07-23

## 2025-07-23 DIAGNOSIS — L82.1 OTHER SEBORRHEIC KERATOSIS: ICD-10-CM

## 2025-07-23 DIAGNOSIS — L81.4 OTHER MELANIN HYPERPIGMENTATION: ICD-10-CM

## 2025-07-23 DIAGNOSIS — D18.0 HEMANGIOMA: ICD-10-CM

## 2025-07-23 DIAGNOSIS — D22 MELANOCYTIC NEVI: ICD-10-CM

## 2025-07-23 DIAGNOSIS — Z85.828 PERSONAL HISTORY OF OTHER MALIGNANT NEOPLASM OF SKIN: ICD-10-CM

## 2025-07-23 DIAGNOSIS — L82.0 INFLAMED SEBORRHEIC KERATOSIS: ICD-10-CM

## 2025-07-23 PROBLEM — D22.5 MELANOCYTIC NEVI OF TRUNK: Status: ACTIVE | Noted: 2025-07-23

## 2025-07-23 PROBLEM — D22.61 MELANOCYTIC NEVI OF RIGHT UPPER LIMB, INCLUDING SHOULDER: Status: ACTIVE | Noted: 2025-07-23

## 2025-07-23 PROBLEM — D22.62 MELANOCYTIC NEVI OF LEFT UPPER LIMB, INCLUDING SHOULDER: Status: ACTIVE | Noted: 2025-07-23

## 2025-07-23 PROBLEM — D18.01 HEMANGIOMA OF SKIN AND SUBCUTANEOUS TISSUE: Status: ACTIVE | Noted: 2025-07-23

## 2025-07-23 PROCEDURE — ? LIQUID NITROGEN

## 2025-07-23 PROCEDURE — ? COUNSELING

## 2025-07-23 PROCEDURE — ? SUNSCREEN RECOMMENDATIONS

## 2025-07-23 PROCEDURE — ? SHAVE REMOVAL (NO PATHOLOGY)

## 2025-07-23 PROCEDURE — ? FULL BODY SKIN EXAM

## 2025-07-23 ASSESSMENT — LOCATION DETAILED DESCRIPTION DERM
LOCATION DETAILED: EPIGASTRIC SKIN
LOCATION DETAILED: RIGHT DISTAL DORSAL FOREARM
LOCATION DETAILED: RIGHT INFERIOR CENTRAL MALAR CHEEK
LOCATION DETAILED: MIDDLE STERNUM
LOCATION DETAILED: LEFT INFERIOR LATERAL UPPER BACK
LOCATION DETAILED: RIGHT INFERIOR PREAURICULAR CHEEK
LOCATION DETAILED: RIGHT SUPERIOR MEDIAL UPPER BACK
LOCATION DETAILED: LEFT DISTAL DORSAL FOREARM
LOCATION DETAILED: LEFT MEDIAL SUPERIOR CHEST
LOCATION DETAILED: LEFT INFERIOR CENTRAL MALAR CHEEK
LOCATION DETAILED: RIGHT PROXIMAL DORSAL FOREARM
LOCATION DETAILED: RIGHT MEDIAL UPPER BACK
LOCATION DETAILED: INFERIOR THORACIC SPINE
LOCATION DETAILED: LEFT PROXIMAL DORSAL FOREARM
LOCATION DETAILED: PERIUMBILICAL SKIN

## 2025-07-23 ASSESSMENT — LOCATION ZONE DERM
LOCATION ZONE: TRUNK
LOCATION ZONE: ARM
LOCATION ZONE: FACE

## 2025-07-23 ASSESSMENT — LOCATION SIMPLE DESCRIPTION DERM
LOCATION SIMPLE: CHEST
LOCATION SIMPLE: RIGHT UPPER BACK
LOCATION SIMPLE: LEFT UPPER BACK
LOCATION SIMPLE: UPPER BACK
LOCATION SIMPLE: RIGHT CHEEK
LOCATION SIMPLE: LEFT CHEEK
LOCATION SIMPLE: RIGHT FOREARM
LOCATION SIMPLE: LEFT FOREARM
LOCATION SIMPLE: ABDOMEN

## 2025-07-23 NOTE — PROCEDURE: LIQUID NITROGEN
Show Spray Paint Technique Variable?: Yes
Include Z78.9 (Other Specified Conditions Influencing Health Status) As An Associated Diagnosis?: No
Medical Necessity Information: It is in your best interest to select a reason for this procedure from the list below. All of these items fulfill various CMS LCD requirements except the new and changing color options.
Medical Necessity Clause: This procedure was medically necessary because the lesions that were treated were:
Spray Paint Text: The liquid nitrogen was applied to the skin utilizing a spray paint frosting technique.
Post-Care Instructions: I reviewed with the patient in detail post-care instructions. Patient is to wear sunprotection, and avoid picking at any of the treated lesions. Pt may apply Vaseline to crusted or scabbing areas.
Consent: The patient's consent was obtained including but not limited to risks of crusting, scabbing, blistering, scarring, darker or lighter pigmentary change, recurrence, incomplete removal and infection.
Detail Level: Detailed

## 2025-07-23 NOTE — PROCEDURE: SHAVE REMOVAL (NO PATHOLOGY)
Medical Necessity Information: It is in your best interest to select a reason for this procedure from the list below. All of these items fulfill various CMS LCD requirements except the new and changing color options.
Include Z78.9 (Other Specified Conditions Influencing Health Status) As An Associated Diagnosis?: No
Medical Necessity Clause: This procedure was medically necessary because the lesion that was treated was: being caught on jewelry/caught on nail
Detail Level: Detailed
Size Of Lesion In Cm: 1
X Size Of Lesion In Cm (Optional): 0
Anesthesia Type: 1% lidocaine with epinephrine
Hemostasis: Aluminum Chloride
Wound Care: Petrolatum
Consent was obtained from the patient. The risks and benefits to therapy were discussed in detail. Specifically, the risks of infection, scarring, bleeding, prolonged wound healing, incomplete removal, allergy to anesthesia, nerve injury and recurrence were addressed. Prior to the procedure, the treatment site was clearly identified and confirmed by the patient. All components of Universal Protocol/PAUSE Rule completed.
Post-Care Instructions: I reviewed with the patient in detail post-care instructions. Patient is to keep the biopsy site dry overnight, and then apply bacitracin twice daily until healed. Patient may apply hydrogen peroxide soaks to remove any crusting.

## 2025-07-23 NOTE — PROCEDURE: COUNSELING
Clinical Summary
  Created on: 2018
 
 Berna Hampton
 External Reference #: [id number]
 : 52
 Sex: Male
 
 Demographics
 
 
+-----------------------+----------------------+
| Address               | [address]          |
|                       | [place]  [address] |
+-----------------------+----------------------+
| Home Phone            | [phone]      |
+-----------------------+----------------------+
| Preferred Language    | Unknown              |
+-----------------------+----------------------+
| Marital Status        |               |
+-----------------------+----------------------+
| Faith Affiliation | Unknown              |
+-----------------------+----------------------+
| Race                  | Unknown              |
+-----------------------+----------------------+
| Ethnic Group          | Unknown              |
+-----------------------+----------------------+
 
 
 Author
 
 
+--------------+--------------------------------------------+
| Author       | [facility]  |
|              | and [place]                                |
+--------------+--------------------------------------------+
| Organization | [facility] [place]  |
|              | and [place]                                |
+--------------+--------------------------------------------+
| Address      | Unknown                                    |
+--------------+--------------------------------------------+
| Phone        | Unavailable                                |
+--------------+--------------------------------------------+
 
 
 
 Support
 
 
+---------------+--------------+---------+-----------------+
| Name          | Relationship | Address | Phone           |
+---------------+--------------+---------+-----------------+
| [place] | CARLENE         | Unknown | [phone] |
+---------------+--------------+---------+-----------------+
 
 
 
 Care Team Providers
 
 
 
+-------------------------+------+-----------------+
| Care Team Member Name   | Role | Phone           |
+-------------------------+------+-----------------+
| Ck Michelle MD | PP   | [phone] |
+-------------------------+------+-----------------+
 
 
 
 Allergies
 
 
+----------------+----------------------+----------+----------+---------------------+
| Active Allergy | Reactions            | Severity | Noted    | Comments            |
|                |                      |          | Date     |                     |
+----------------+----------------------+----------+----------+---------------------+
| Meperidine     | Nausea And Vomiting  | Medium   | 05/15 |                     |
|                |                      |          | 17       |                     |
+----------------+----------------------+----------+----------+---------------------+
| Oxycodone      | Other (See Comments) | Medium   | 20 |   Hallucinations &  |
|                |                      |          | 17       | sleepiness          |
+----------------+----------------------+----------+----------+---------------------+
 
 
 
 Current Medications
 
 
+----------------------+----------------------+--------+---------+------+------+-------+
| Prescription         | Sig.                 | Disp.  | Refills | Star | End  | Statu |
|                      |                      |        |         | t    | Date | s     |
|                      |                      |        |         | Date |      |       |
+----------------------+----------------------+--------+---------+------+------+-------+
|                      | Take 1 tablet by     |        |         |      |      | Activ |
| fexofenadine-pseudoe | mouth Twice  daily   |        |         |      |      | e     |
| PHEDrine (ALLEGRA-D  | as needed.           |        |         |      |      |       |
| ALLERGY &            |                      |        |         |      |      |       |
| CONGESTION)    |                      |        |         |      |      |       |
| MG per tablet        |                      |        |         |      |      |       |
+----------------------+----------------------+--------+---------+------+------+-------+
|   atorvaSTATin       | Take 40 mg by mouth  |        |         | 02/0 |      | Activ |
| (LIPITOR) 40 mg      | Daily.               |        |         | 4/20 |      | e     |
| tablet               |                      |        |         | 15   |      |       |
+----------------------+----------------------+--------+---------+------+------+-------+
|   FLUoxetine         | Take 20 mg by mouth  |        |         |      |      | Activ |
| (PROZAC) 20 mg       | Daily.               |        |         |      |      | e     |
| capsule              |                      |        |         |      |      |       |
+----------------------+----------------------+--------+---------+------+------+-------+
|   fluticasone        | 1 spray by Nasal     |        |         |      |      | Activ |
| (FLONASE) 50         | route Daily.         |        |         |      |      | e     |
| mcg/nasal spray      |                      |        |         |      |      |       |
+----------------------+----------------------+--------+---------+------+------+-------+
|   metoprolol         | Take 25 mg by mouth  |        |         | 02/0 |      | Activ |
| succinate            | Daily.               |        |         | 6/20 |      | e     |
| (TOPROL-XL) 25 mg 24 |                      |        |         | 17   |      |       |
|  hr tablet           |                      |        |         |      |      |       |
+----------------------+----------------------+--------+---------+------+------+-------+
|   cholecalciferol    | Take 1,000 Units by  |        |         |      |      | Activ |
| (VITAMIN D-3) 1,000  | mouth Daily.         |        |         |      |      | e     |
 
| units tablet         |                      |        |         |      |      |       |
+----------------------+----------------------+--------+---------+------+------+-------+
|   B Complex Vitamins | Take 1 tablet by     |        |         |      |      | Activ |
|  (VITAMIN B COMPLEX) | mouth Daily.         |        |         |      |      | e     |
|  tablet              |                      |        |         |      |      |       |
+----------------------+----------------------+--------+---------+------+------+-------+
|   ASCORBIC ACID PO   | Take  by mouth.      |        |         |      |      | Activ |
|                      |                      |        |         |      |      | e     |
+----------------------+----------------------+--------+---------+------+------+-------+
|   diazePAM (VALIUM)  | Take 1 tablet by     |   90   | 1       | 11/1 |      | Activ |
| 5 mg tablet          | mouth every 6 hours  | tablet |         | 5/20 |      | e     |
|                      | as needed for Muscle |        |         | 17   |      |       |
|                      |  spasms.             |        |         |      |      |       |
+----------------------+----------------------+--------+---------+------+------+-------+
|                      | Take 0.5-1 tablets   |   120  | 0       | 11/1 |      | Activ |
| HYDROcodone-acetamin | by mouth every 6     | tablet |         | 5/20 |      | e     |
| ophen (NORCO)  | hours as needed for  |        |         | 17   |      |       |
|  mg per tablet       | Pain.                |        |         |      |      |       |
+----------------------+----------------------+--------+---------+------+------+-------+
 
 
 
 Active Problems
 
 
+------------------------------------------------------------------+------------+
| Problem                                                          | Noted Date |
+------------------------------------------------------------------+------------+
| Anticipated difficulty with intubation                           | 2017 |
+------------------------------------------------------------------+------------+
| Osteoarthritis of spine with radiculopathy, lumbar region - Jul  | 2017 |
|                                                              |            |
+------------------------------------------------------------------+------------+
| Beta Blockers - Daily Use                                        | 2017 |
+------------------------------------------------------------------+------------+
| BPH (benign prostatic hyperplasia)                               | 2017 |
+------------------------------------------------------------------+------------+
| LAURA (obstructive sleep apnea) - H/O CPAP Use                     | 2017 |
+------------------------------------------------------------------+------------+
 
 
 
+-------------------------+
|   Overview:   uses CPAP |
+-------------------------+
 
 
 
+--------------------------------------------+------------+
| H/O TKA Total knee arthroplasty, BILATERAL | 2017 |
+--------------------------------------------+------------+
| H/O LEFT Ankle fusion                      | 2017 |
+--------------------------------------------+------------+
| H/O Lumbar discectomy L1-2, L2-3 - 2000    | 2017 |
+--------------------------------------------+------------+
| Lumbago with sciatica, right side          |            |
+--------------------------------------------+------------+
| Low back pain                              |            |
+--------------------------------------------+------------+
| Atherosclerosis                            |            |
 
+--------------------------------------------+------------+
 
 
 
 Family History
 
 
+---------------------+-----------+------+-------------------+
| Medical History     | Relation  | Name | Comments          |
+---------------------+-----------+------+-------------------+
| No Known Problems   | Child     |      |                   |
+---------------------+-----------+------+-------------------+
| No Known Problems   | Child     |      |                   |
+---------------------+-----------+------+-------------------+
| Cancer              | Father    |      | Multiple Myeloma  |
+---------------------+-----------+------+-------------------+
| Coronary artery     | Father    |      |                   |
| disease             |           |      |                   |
+---------------------+-----------+------+-------------------+
| Multiple sclerosis  | Father    |      |                   |
+---------------------+-----------+------+-------------------+
| Cancer              | Maternal  |      |                   |
|                     | Grandfath |      |                   |
|                     | er        |      |                   |
+---------------------+-----------+------+-------------------+
| Cancer              | Maternal  |      |                   |
|                     | Grandmoth |      |                   |
|                     | er        |      |                   |
+---------------------+-----------+------+-------------------+
| Cancer              | Mother    |      | Pancreatic        |
+---------------------+-----------+------+-------------------+
| Lymphoma            | Mother    |      |                   |
+---------------------+-----------+------+-------------------+
| Rheum arthritis     | Mother    |      |                   |
+---------------------+-----------+------+-------------------+
| Heart disease       | Paternal  |      |                   |
|                     | Grandfath |      |                   |
|                     | er        |      |                   |
+---------------------+-----------+------+-------------------+
| No Known Problems   | Paternal  |      |                   |
|                     | Grandmoth |      |                   |
|                     | er        |      |                   |
+---------------------+-----------+------+-------------------+
| Lupus               | Sister    |      |                   |
+---------------------+-----------+------+-------------------+
| Other (see comment) | Sister    |      | Meniere's disease |
+---------------------+-----------+------+-------------------+
 
 
 
+----------------------+------+----------+-------------------+
| Relation             | Name | Status   | Comments          |
+----------------------+------+----------+-------------------+
| Child                |      | Other    | STATUS NOT LISTED |
+----------------------+------+----------+-------------------+
| Child                |      | Other    | STATUS NOT LISTED |
+----------------------+------+----------+-------------------+
| Child                |      |          |                   |
+----------------------+------+----------+-------------------+
| Child                |      |          |                   |
 
+----------------------+------+----------+-------------------+
| Father               |      |  |                   |
|                      |      |   (Age   |                   |
|                      |      | 73)      |                   |
+----------------------+------+----------+-------------------+
| Maternal Grandfather |      |  |                   |
|                      |      |   (Age   |                   |
|                      |      | 68)      |                   |
+----------------------+------+----------+-------------------+
| Maternal Grandmother |      |  |                   |
|                      |      |   (Age   |                   |
|                      |      | 73)      |                   |
+----------------------+------+----------+-------------------+
| Mother               |      |  | CANCER            |
|                      |      |   (Age   |                   |
|                      |      | 82)      |                   |
+----------------------+------+----------+-------------------+
| Paternal Grandfather |      |  | HEART             |
+----------------------+------+----------+-------------------+
| Paternal Grandmother |      |  |                   |
|                      |      |   (Age   |                   |
|                      |      | 76)      |                   |
+----------------------+------+----------+-------------------+
| Sister               |      | Alive    |                   |
+----------------------+------+----------+-------------------+
| Sister               |      |          |                   |
+----------------------+------+----------+-------------------+
| Sister               |      |          |                   |
+----------------------+------+----------+-------------------+
 
 
 
 Social History
 
 
+--------------+-------+-----------+--------+------+
| Tobacco Use  | Types | Packs/Day | Years  | Date |
|              |       |           | Used   |      |
+--------------+-------+-----------+--------+------+
| Never Smoker |       |           |        |      |
+--------------+-------+-----------+--------+------+
 
 
 
+---------------------+---+---+---+
| Smokeless Tobacco:  |   |   |   |
| Never Used          |   |   |   |
+---------------------+---+---+---+
 
 
 
+-------------+-----------+---------+--------------------------------------------+
| Alcohol Use | Drinks/We | oz/Week | Comments                                   |
|             | ek        |         |                                            |
+-------------+-----------+---------+--------------------------------------------+
| No          |   0       | 0.0     | No longer drinking 1-2 drinks 2-4 times a  |
|             | Standard  |         | month                                      |
|             | drinks or |         |                                            |
|             |           |         |                                            |
|             | equivalen |         |                                            |
 
|             | t         |         |                                            |
+-------------+-----------+---------+--------------------------------------------+
 
 
 
+------------------+---------------+
| Sex Assigned at  | Date Recorded |
| Birth            |               |
+------------------+---------------+
| Not on file      |               |
+------------------+---------------+
 
 
 
 Last Filed Vital Signs
 
 
+-------------------+---------------------+---------------------+
| Vital Sign        | Reading             | Time Taken          |
+-------------------+---------------------+---------------------+
| Blood Pressure    | 126/71              | 11/15/2017 0834 PST |
+-------------------+---------------------+---------------------+
| Pulse             | 73                  | 11/15/2017 0834 PST |
+-------------------+---------------------+---------------------+
| Temperature       | 37.5   C (99.5   F) | 2017 PDT |
+-------------------+---------------------+---------------------+
| Respiratory Rate  | 16                  | 2017 PDT |
+-------------------+---------------------+---------------------+
| Oxygen Saturation | 93%                 | 201745 PDT |
+-------------------+---------------------+---------------------+
| Inhaled Oxygen    | -                   | -                   |
| Concentration     |                     |                     |
+-------------------+---------------------+---------------------+
| Weight            | 101.2 kg (223 lb)   | 11/15/2017 0834 PST |
+-------------------+---------------------+---------------------+
| Height            | 190.5 cm (6' 3")    | 11/15/2017 0834 PST |
+-------------------+---------------------+---------------------+
| Body Mass Index   | 27.87               | 11/15/2017 0834 PST |
+-------------------+---------------------+---------------------+
 
 
 
 Plan of Treatment
 
 
+---------------------+-----------+------------+----------+
| Health Maintenance  | Due Date  | Last Done  | Comments |
+---------------------+-----------+------------+----------+
| Hepatitis C         |  |            |          |
| Screening           | 1         |            |          |
+---------------------+-----------+------------+----------+
| Vaccine:            |  |            |          |
| Dtap/Tdap/Td (1 -   | 0         |            |          |
| Tdap)               |           |            |          |
+---------------------+-----------+------------+----------+
| Vaccine:            |  |            |          |
| Pneumococcal 65+    | 6         |            |          |
| Low/Medium Risk (1  |           |            |          |
| of 2 - PCV13)       |           |            |          |
+---------------------+-----------+------------+----------+
 
| Vaccine: Influenza  |  |            |          |
| (#1)                | 8         |            |          |
+---------------------+-----------+------------+----------+
| Colorectal Cancer   |  | 2013 |          |
| Screening           | 3         |            |          |
| (Colonoscopy)       |           |            |          |
+---------------------+-----------+------------+----------+
 
 
 
 Implants
 
 
+-------------------------------+--------+--------+-------------+--------+--------+--------+
| Implanted                     | Type   | Area   | Manufacture | Device | Expira | Model  |
|                               |        |        | r           |        | tion   | /      |
|                               |        |        |             | Identi | Date   | Serial |
|                               |        |        |             | fier   |        |  / Lot |
+-------------------------------+--------+--------+-------------+--------+--------+--------+
| Bone Chips 15cc -             | Bone   | Poster | RTI         |        | / | [id number] |
| [id number]: Qty: 1  |        | ior:   | BIOLOGICS   |        |    |        |
| on 2017 by Dreyer,      |        | Spine  | INC - [employer]  |        |        | /[id number] |
| Chacho AGUIRRE DO                   |        | Lumbar |             |        |        | 6-016  |
|                               |        |        |             |        |        | /60-31 |
|                               |        |        |             |        |        | 28     |
+-------------------------------+--------+--------+-------------+--------+--------+--------+
| Imp Spn Spcr [id number] -        | Generi | Poster | [employer] - |        | / | [id number] |
| [id number]: Qty: 1 on | c      | ior:   |  MEDT       |        | 2024   | 1 /    |
|  2017 by Dreyer, Jason  |        | Spine  |             |        |        | / |
| DO TIMOTHY                         |        | Lumbar |             |        |        | 673    |
+-------------------------------+--------+--------+-------------+--------+--------+--------+
| Imp Spn Claudio Ti Sext Ti 5.5x45 | Generi | Poster | [employer]AMOR     |        |        | [id number] |
|  - [id number]: Qty: 2 | c      | ior:   | DANEK - DIV |        |        | 5045 / |
|  on 2017 by Dreyer,     |        | Spine  |  [employer]  |        |        |  /     |
| Chacho AGUIRRE DO                   |        | Lumbar | - SFDK      |        |        |        |
+-------------------------------+--------+--------+-------------+--------+--------+--------+
| Graft Infuse Bone Kit Xxs -   | Graft  | Poster | SO[employer]AMOR     |        | / | [id number] |
| [id number]: Qty: 1 on |        | ior:   | DANEK - DIV |        | 2018   | 0 /    |
|  2017 by Dreyer, Jason  |        | Spine  |  [employer]  |        |        | / |
| DO TIMOTHY                         |        | Lumbar | - SFDK      |        |        | [id number]  |
+-------------------------------+--------+--------+-------------+--------+--------+--------+
| Set Scrw Ns G5 Brk Off Ti     | Screw  | Poster | SO[employer]AMOR     |        |        | [id number] |
| 4.75 - [id number]:    |        | ior:   | DANEK - DIV |        |        | 0 / /  |
| Qty: 4 on 2017 by       |        | Spine  |  [employer]  |        |        |        |
| Dreyer, Jason A, DO           |        | Lumbar | - SFDK      |        |        |        |
+-------------------------------+--------+--------+-------------+--------+--------+--------+
| Screw Miriam Solera 6.5x55mm -  | Screw  | Poster | SO[employer]AMOR     |        |        | [id number] |
| [id number]: Qty: 2 on |        | ior:   | DANEK - DIV |        |        | 51722  |
|  2017 by Dreyer, Jason  |        | Spine  |  [employer]  |        |        | / /    |
| A DO                         |        | Lumbar | - SFDK      |        |        |        |
+-------------------------------+--------+--------+-------------+--------+--------+--------+
| Screw Miriam Solera 6.5x60mm -  | Screw  | Poster | SO[employer]AMOR     |        |        | [id number] |
| [id number]: Qty: 2 on |        | ior:   | DANEK - DIV |        |        | 57652  |
|  2017 by Dreyer, Jason  |        | Spine  |  [employer]  |        |        | / /    |
| A, DO                         |        | Lumbar | - SFDK      |        |        |        |
+-------------------------------+--------+--------+-------------+--------+--------+--------+
 
 
 
 Results
 
 Not on filefrom Last 3 Months
 
 Insurance
 
 
+----------+--------+-------------+--------+-------------+--------------------+
| Payer    | Benefi | Subscriber  | Type   | Phone       | Address            |
|          | t Plan | ID          |        |             |                    |
|          |  /     |             |        |             |                    |
|          | Group  |             |        |             |                    |
+----------+--------+-------------+--------+-------------+--------------------+
| MEDICARE | MEDICA | [id number]  | [employer] | [phone]                    |
|          | RE     |             | re     | [phone]        |                    |
|          | PART A |             |        |             |                    |
+----------+--------+-------------+--------+-------------+--------------------+
| MODA     | MODA   | [id number]   | PPO    | [phone]   [address] [address]     |
|          | [place]   |             |        | [phone]        | [place] [address] |
|          | [facility] |             |        |             |                    |
|          | US     |             |        |             |                    |
+----------+--------+-------------+--------+-------------+--------------------+
 
 
 
+-------------------+--------+-------------+--------+-------------+---------------------+
| Guarantor Name    | Accoun | Relation to | Date   | Phone       | Billing Address     |
|                   | t Type |  Patient    | of     |             |                     |
|                   |        |             | Birth  |             |                     |
+-------------------+--------+-------------+--------+-------------+---------------------+
| [place] | Person | Self        | / |   Work:     |   [address]       |
|                   | [address] |             |    | [phone] | [place] [address] |
|                   | heath    |             |        | [address]  Home: |                     |
|                   |        |             |        |             |                     |
|                   |        |             |        | [phone] |                     |
|                   |        |             |        | [address]092        |                     |
+-------------------+--------+-------------+--------+-------------+---------------------+
Clinical Summary
  Created on: 2018
 
 Berna Hampton
 External Reference #: [id number]
 : 52
 Sex: Male
 
 Demographics
 
 
+-----------------------+----------------------+
| Address               | [address]          |
|                       | [place]  [address] |
+-----------------------+----------------------+
| Home Phone            | [phone]      |
+-----------------------+----------------------+
| Preferred Language    | Unknown              |
+-----------------------+----------------------+
| Marital Status        |               |
+-----------------------+----------------------+
| Spiritism Affiliation | Unknown              |
+-----------------------+----------------------+
| Race                  | Unknown              |
+-----------------------+----------------------+
| Ethnic Group          | Unknown              |
+-----------------------+----------------------+
 
 
 Author
 
 
+--------------+--------------------------------------------+
| Author       | [facility]  |
|              | and [place]                                |
+--------------+--------------------------------------------+
| Organization | [facility]  |
|              | and [place]                                |
+--------------+--------------------------------------------+
| Address      | Unknown                                    |
+--------------+--------------------------------------------+
| Phone        | Unavailable                                |
+--------------+--------------------------------------------+
 
 
 
 Support
 
 
+---------------+--------------+---------+-----------------+
| Name          | Relationship | Address | Phone           |
+---------------+--------------+---------+-----------------+
| [place] | CARLENE         | Unknown | [phone] |
+---------------+--------------+---------+-----------------+
 
 
 
 Care Team Providers
 
 
 
+-------------------------+------+-----------------+
| Care Team Member Name   | Role | Phone           |
+-------------------------+------+-----------------+
| Ck Michelle MD | PP   | [phone] |
+-------------------------+------+-----------------+
 
 
 
 Allergies
 
 
+----------------+----------------------+----------+----------+---------------------+
| Active Allergy | Reactions            | Severity | Noted    | Comments            |
|                |                      |          | Date     |                     |
+----------------+----------------------+----------+----------+---------------------+
| Meperidine     | Nausea And Vomiting  | Medium   | 05/15 |                     |
|                |                      |          | 17       |                     |
+----------------+----------------------+----------+----------+---------------------+
| Oxycodone      | Other (See Comments) | Medium   | 20 |   Hallucinations &  |
|                |                      |          | 17       | sleepiness          |
+----------------+----------------------+----------+----------+---------------------+
 
 
 
 Current Medications
 
 
+----------------------+----------------------+--------+---------+------+------+-------+
| Prescription         | Sig.                 | Disp.  | Refills | Star | End  | Statu |
|                      |                      |        |         | t    | Date | s     |
|                      |                      |        |         | Date |      |       |
+----------------------+----------------------+--------+---------+------+------+-------+
|                      | Take 1 tablet by     |        |         |      |      | Activ |
| fexofenadine-pseudoe | mouth Twice  daily   |        |         |      |      | e     |
| PHEDrine (ALLEGRA-D  | as needed.           |        |         |      |      |       |
| ALLERGY &            |                      |        |         |      |      |       |
| CONGESTION)    |                      |        |         |      |      |       |
| MG per tablet        |                      |        |         |      |      |       |
+----------------------+----------------------+--------+---------+------+------+-------+
|   atorvaSTATin       | Take 40 mg by mouth  |        |         | 02/0 |      | Activ |
| (LIPITOR) 40 mg      | Daily.               |        |         | 4/20 |      | e     |
| tablet               |                      |        |         | 15   |      |       |
+----------------------+----------------------+--------+---------+------+------+-------+
|   FLUoxetine         | Take 20 mg by mouth  |        |         |      |      | Activ |
| (PROZAC) 20 mg       | Daily.               |        |         |      |      | e     |
| capsule              |                      |        |         |      |      |       |
+----------------------+----------------------+--------+---------+------+------+-------+
|   fluticasone        | 1 spray by Nasal     |        |         |      |      | Activ |
| (FLONASE) 50         | route Daily.         |        |         |      |      | e     |
| mcg/nasal spray      |                      |        |         |      |      |       |
+----------------------+----------------------+--------+---------+------+------+-------+
|   metoprolol         | Take 25 mg by mouth  |        |         | 02/0 |      | Activ |
| succinate            | Daily.               |        |         | 6/20 |      | e     |
| (TOPROL-XL) 25 mg 24 |                      |        |         | 17   |      |       |
|  hr tablet           |                      |        |         |      |      |       |
+----------------------+----------------------+--------+---------+------+------+-------+
|   cholecalciferol    | Take 1,000 Units by  |        |         |      |      | Activ |
| (VITAMIN D-3) 1,000  | mouth Daily.         |        |         |      |      | e     |
 
| units tablet         |                      |        |         |      |      |       |
+----------------------+----------------------+--------+---------+------+------+-------+
|   B Complex Vitamins | Take 1 tablet by     |        |         |      |      | Activ |
|  (VITAMIN B COMPLEX) | mouth Daily.         |        |         |      |      | e     |
|  tablet              |                      |        |         |      |      |       |
+----------------------+----------------------+--------+---------+------+------+-------+
|   ASCORBIC ACID PO   | Take  by mouth.      |        |         |      |      | Activ |
|                      |                      |        |         |      |      | e     |
+----------------------+----------------------+--------+---------+------+------+-------+
|   diazePAM (VALIUM)  | Take 1 tablet by     |   90   | 1       | 11/1 |      | Activ |
| 5 mg tablet          | mouth every 6 hours  | tablet |         | 5/20 |      | e     |
|                      | as needed for Muscle |        |         | 17   |      |       |
|                      |  spasms.             |        |         |      |      |       |
+----------------------+----------------------+--------+---------+------+------+-------+
|                      | Take 0.5-1 tablets   |   120  | 0       | 11/1 |      | Activ |
| HYDROcodone-acetamin | by mouth every 6     | tablet |         | 5/20 |      | e     |
| ophen (NORCO)  | hours as needed for  |        |         | 17   |      |       |
|  mg per tablet       | Pain.                |        |         |      |      |       |
+----------------------+----------------------+--------+---------+------+------+-------+
 
 
 
 Active Problems
 
 
+------------------------------------------------------------------+------------+
| Problem                                                          | Noted Date |
+------------------------------------------------------------------+------------+
| Anticipated difficulty with intubation                           | 2017 |
+------------------------------------------------------------------+------------+
| Osteoarthritis of spine with radiculopathy, lumbar region - Jul  | 2017 |
|                                                              |            |
+------------------------------------------------------------------+------------+
| Beta Blockers - Daily Use                                        | 2017 |
+------------------------------------------------------------------+------------+
| BPH (benign prostatic hyperplasia)                               | 2017 |
+------------------------------------------------------------------+------------+
| LAURA (obstructive sleep apnea) - H/O CPAP Use                     | 2017 |
+------------------------------------------------------------------+------------+
 
 
 
+-------------------------+
|   Overview:   uses CPAP |
+-------------------------+
 
 
 
+--------------------------------------------+------------+
| H/O TKA Total knee arthroplasty, BILATERAL | 2017 |
+--------------------------------------------+------------+
| H/O LEFT Ankle fusion                      | 2017 |
+--------------------------------------------+------------+
| H/O Lumbar discectomy L1-2, L2-3 - 2000    | 2017 |
+--------------------------------------------+------------+
| Lumbago with sciatica, right side          |            |
+--------------------------------------------+------------+
| Low back pain                              |            |
+--------------------------------------------+------------+
| Atherosclerosis                            |            |
 
+--------------------------------------------+------------+
 
 
 
 Family History
 
 
+---------------------+-----------+------+-------------------+
| Medical History     | Relation  | Name | Comments          |
+---------------------+-----------+------+-------------------+
| No Known Problems   | Child     |      |                   |
+---------------------+-----------+------+-------------------+
| No Known Problems   | Child     |      |                   |
+---------------------+-----------+------+-------------------+
| Cancer              | Father    |      | Multiple Myeloma  |
+---------------------+-----------+------+-------------------+
| Coronary artery     | Father    |      |                   |
| disease             |           |      |                   |
+---------------------+-----------+------+-------------------+
| Multiple sclerosis  | Father    |      |                   |
+---------------------+-----------+------+-------------------+
| Cancer              | Maternal  |      |                   |
|                     | Grandfath |      |                   |
|                     | er        |      |                   |
+---------------------+-----------+------+-------------------+
| Cancer              | Maternal  |      |                   |
|                     | Grandmoth |      |                   |
|                     | er        |      |                   |
+---------------------+-----------+------+-------------------+
| Cancer              | Mother    |      | Pancreatic        |
+---------------------+-----------+------+-------------------+
| Lymphoma            | Mother    |      |                   |
+---------------------+-----------+------+-------------------+
| Rheum arthritis     | Mother    |      |                   |
+---------------------+-----------+------+-------------------+
| Heart disease       | Paternal  |      |                   |
|                     | Grandfath |      |                   |
|                     | er        |      |                   |
+---------------------+-----------+------+-------------------+
| No Known Problems   | Paternal  |      |                   |
|                     | Grandmoth |      |                   |
|                     | er        |      |                   |
+---------------------+-----------+------+-------------------+
| Lupus               | Sister    |      |                   |
+---------------------+-----------+------+-------------------+
| Other (see comment) | Sister    |      | Meniere's disease |
+---------------------+-----------+------+-------------------+
 
 
 
+----------------------+------+----------+-------------------+
| Relation             | Name | Status   | Comments          |
+----------------------+------+----------+-------------------+
| Child                |      | Other    | STATUS NOT LISTED |
+----------------------+------+----------+-------------------+
| Child                |      | Other    | STATUS NOT LISTED |
+----------------------+------+----------+-------------------+
| Child                |      |          |                   |
+----------------------+------+----------+-------------------+
| Child                |      |          |                   |
 
+----------------------+------+----------+-------------------+
| Father               |      |  |                   |
|                      |      |   (Age   |                   |
|                      |      | 73)      |                   |
+----------------------+------+----------+-------------------+
| Maternal Grandfather |      |  |                   |
|                      |      |   (Age   |                   |
|                      |      | 68)      |                   |
+----------------------+------+----------+-------------------+
| Maternal Grandmother |      |  |                   |
|                      |      |   (Age   |                   |
|                      |      | 73)      |                   |
+----------------------+------+----------+-------------------+
| Mother               |      |  | CANCER            |
|                      |      |   (Age   |                   |
|                      |      | 82)      |                   |
+----------------------+------+----------+-------------------+
| Paternal Grandfather |      |  | HEART             |
+----------------------+------+----------+-------------------+
| Paternal Grandmother |      |  |                   |
|                      |      |   (Age   |                   |
|                      |      | 76)      |                   |
+----------------------+------+----------+-------------------+
| Sister               |      | Alive    |                   |
+----------------------+------+----------+-------------------+
| Sister               |      |          |                   |
+----------------------+------+----------+-------------------+
| Sister               |      |          |                   |
+----------------------+------+----------+-------------------+
 
 
 
 Social History
 
 
+--------------+-------+-----------+--------+------+
| Tobacco Use  | Types | Packs/Day | Years  | Date |
|              |       |           | Used   |      |
+--------------+-------+-----------+--------+------+
| Never Smoker |       |           |        |      |
+--------------+-------+-----------+--------+------+
 
 
 
+---------------------+---+---+---+
| Smokeless Tobacco:  |   |   |   |
| Never Used          |   |   |   |
+---------------------+---+---+---+
 
 
 
+-------------+-----------+---------+--------------------------------------------+
| Alcohol Use | Drinks/We | oz/Week | Comments                                   |
|             | ek        |         |                                            |
+-------------+-----------+---------+--------------------------------------------+
| No          |   0       | 0.0     | No longer drinking 1-2 drinks 2-4 times a  |
|             | Standard  |         | month                                      |
|             | drinks or |         |                                            |
|             |           |         |                                            |
|             | equivalen |         |                                            |
 
|             | t         |         |                                            |
+-------------+-----------+---------+--------------------------------------------+
 
 
 
+------------------+---------------+
| Sex Assigned at  | Date Recorded |
| Birth            |               |
+------------------+---------------+
| Not on file      |               |
+------------------+---------------+
 
 
 
 Last Filed Vital Signs
 
 
+-------------------+---------------------+---------------------+
| Vital Sign        | Reading             | Time Taken          |
+-------------------+---------------------+---------------------+
| Blood Pressure    | 126/71              | 11/15/2017 0834 PST |
+-------------------+---------------------+---------------------+
| Pulse             | 73                  | 11/15/2017 0834 PST |
+-------------------+---------------------+---------------------+
| Temperature       | 37.5   C (99.5   F) | 2017 PDT |
+-------------------+---------------------+---------------------+
| Respiratory Rate  | 16                  | 2017 PDT |
+-------------------+---------------------+---------------------+
| Oxygen Saturation | 93%                 | 201745 PDT |
+-------------------+---------------------+---------------------+
| Inhaled Oxygen    | -                   | -                   |
| Concentration     |                     |                     |
+-------------------+---------------------+---------------------+
| Weight            | 101.2 kg (223 lb)   | 11/15/2017 0834 PST |
+-------------------+---------------------+---------------------+
| Height            | 190.5 cm (6' 3")    | 11/15/2017 0834 PST |
+-------------------+---------------------+---------------------+
| Body Mass Index   | 27.87               | 11/15/2017 0834 PST |
+-------------------+---------------------+---------------------+
 
 
 
 Plan of Treatment
 
 
+---------------------+-----------+------------+----------+
| Health Maintenance  | Due Date  | Last Done  | Comments |
+---------------------+-----------+------------+----------+
| Hepatitis C         |  |            |          |
| Screening           | 1         |            |          |
+---------------------+-----------+------------+----------+
| Vaccine:            |  |            |          |
| Dtap/Tdap/Td (1 -   | 0         |            |          |
| Tdap)               |           |            |          |
+---------------------+-----------+------------+----------+
| Vaccine:            |  |            |          |
| Pneumococcal 65+    | 6         |            |          |
| Low/Medium Risk (1  |           |            |          |
| of 2 - PCV13)       |           |            |          |
+---------------------+-----------+------------+----------+
 
| Vaccine: Influenza  |  |            |          |
| (#1)                | 8         |            |          |
+---------------------+-----------+------------+----------+
| Colorectal Cancer   |  | 2013 |          |
| Screening           | 3         |            |          |
| (Colonoscopy)       |           |            |          |
+---------------------+-----------+------------+----------+
 
 
 
 Implants
 
 
+-------------------------------+--------+--------+-------------+--------+--------+--------+
| Implanted                     | Type   | Area   | Manufacture | Device | Expira | Model  |
|                               |        |        | r           |        | tion   | /      |
|                               |        |        |             | Identi | Date   | Serial |
|                               |        |        |             | fier   |        |  / Lot |
+-------------------------------+--------+--------+-------------+--------+--------+--------+
| Bone Chips 15cc -             | Bone   | Poster | RTI         |        | / | [id number] |
| [id number]: Qty: 1  |        | ior:   | BIOLOGICS   |        |    |        |
| on 2017 by Dreyer,      |        | Spine  | INC - [employer]  |        |        | /[id number] |
| Chacho AGUIRRE DO                   |        | Lumbar |             |        |        | 6-016  |
|                               |        |        |             |        |        | /60-31 |
|                               |        |        |             |        |        | 28     |
+-------------------------------+--------+--------+-------------+--------+--------+--------+
| Imp Spn Spcr [id number] -        | Generi | Poster | [employer] - |        | / | [id number] |
| [id number]: Qty: 1 on | c      | ior:   |  MEDT       |        | 2024   | 1 /    |
|  2017 by Dreyer, Jason  |        | Spine  |             |        |        | / |
| DO TIMOTHY                         |        | Lumbar |             |        |        | 673    |
+-------------------------------+--------+--------+-------------+--------+--------+--------+
| Imp Spn Claudio Ti Sext Ti 5.5x45 | Generi | Poster | [employer]     |        |        | [id number] |
|  - [id number]: Qty: 2 | c      | ior:   | DANEK - DIV |        |        | 5045 / |
|  on 2017 by Dreyer,     |        | Spine  |  [employer]  |        |        |  /     |
| Chacho AGUIRRE DO                   |        | Lumbar | - SFDK      |        |        |        |
+-------------------------------+--------+--------+-------------+--------+--------+--------+
| Graft Infuse Bone Kit Xxs -   | Graft  | Poster | SO[employer]AMOR     |        | / | [id number] |
| [id number]: Qty: 1 on |        | ior:   | DANEK - DIV |        | 2018   | 0 /    |
|  2017 by Dreyer, Jason  |        | Spine  |  [employer]  |        |        | / |
| DO TIMOTHY                         |        | Lumbar | - SFDK      |        |        | [id number]  |
+-------------------------------+--------+--------+-------------+--------+--------+--------+
| Set Scrw Ns G5 Brk Off Ti     | Screw  | Poster | SO[employer]AMOR     |        |        | 310643 |
| 4.75 - [id number]:    |        | ior:   | DANEK - DIV |        |        | 0 / /  |
| Qty: 4 on 2017 by       |        | Spine  |  [employer]  |        |        |        |
| Dreyer, Jason A, DO           |        | Lumbar | - SFDK      |        |        |        |
+-------------------------------+--------+--------+-------------+--------+--------+--------+
| Screw Miriam Solera 6.5x55mm -  | Screw  | Poster | SO[employer]AMOR     |        |        | [id number] |
| [id number]: Qty: 2 on |        | ior:   | DANEK - DIV |        |        | 61427  |
|  2017 by Dreyer, Jason  |        | Spine  |  [employer]  |        |        | / /    |
| A DO                         |        | Lumbar | - SFDK      |        |        |        |
+-------------------------------+--------+--------+-------------+--------+--------+--------+
| Screw Miriam Solera 6.5x60mm -  | Screw  | Poster | SO[employer]AMOR     |        |        | [id number] |
| [id number]: Qty: 2 on |        | ior:   | DANEK - DIV |        |        | 93670  |
|  2017 by Dreyer, Jason  |        | Spine  |  [facility]  |        |        | / /    |
| A, DO                         |        | Lumbar | - [facility]      |        |        |        |
+-------------------------------+--------+--------+-------------+--------+--------+--------+
 
 
 
 Results
 
 Not on filefrom Last 3 Months
 
 Insurance
 
 
+----------+--------+-------------+--------+-------------+--------------------+
| Payer    | Benefi | Subscriber  | Type   | Phone       | Address            |
|          | t Plan | ID          |        |             |                    |
|          |  /     |             |        |             |                    |
|          | Group  |             |        |             |                    |
+----------+--------+-------------+--------+-------------+--------------------+
| MEDICARE | MEDICA | [id number]  | [employer] | [phone] |                    |
|          | RE     |             | re     | [phone]        |                    |
|          | PART A |             |        |             |                    |
+----------+--------+-------------+--------+-------------+--------------------+
| MODA     | MODA   | [id number]   | PPO    | [phone] |   [address] [address]     |
|          | [place]   |             |        | [phone]        | [place] [address] |
|          | [place] |             |        |             |                    |
|          | [place]     |             |        |             |                    |
+----------+--------+-------------+--------+-------------+--------------------+
 
 
 
+-------------------+--------+-------------+--------+-------------+---------------------+
| Guarantor Name    | Accoun | Relation to | Date   | Phone       | Billing Address     |
|                   | t Type |  Patient    | of     |             |                     |
|                   |        |             | Birth  |             |                     |
+-------------------+--------+-------------+--------+-------------+---------------------+
| [employer] | Person | Self        | / |   Work:     |   [address]       |
|                   | [address] |             |    | [phone] | [place] [address] |
|                   | heath    |             |        | [phone]  Home: |                     |
|                   |        |             |        |             |                     |
|                   |        |             |        | [phone] |                     |
|                   |        |             |        | [address]        |                     |
+-------------------+--------+-------------+--------+-------------+---------------------+
Detail Level: Zone
Sunscreen Recommendations: Recommended she see a plastic surgeon if she would like them removed.
Detail Level: Detailed